# Patient Record
Sex: MALE | Race: WHITE | Employment: FULL TIME | URBAN - METROPOLITAN AREA
[De-identification: names, ages, dates, MRNs, and addresses within clinical notes are randomized per-mention and may not be internally consistent; named-entity substitution may affect disease eponyms.]

---

## 2021-07-30 ENCOUNTER — OFFICE VISIT (OUTPATIENT)
Dept: ORTHOPEDIC SURGERY | Age: 51
End: 2021-07-30
Payer: COMMERCIAL

## 2021-07-30 VITALS — WEIGHT: 170 LBS | BODY MASS INDEX: 23.8 KG/M2 | HEIGHT: 71 IN

## 2021-07-30 VITALS — BODY MASS INDEX: 23.8 KG/M2 | WEIGHT: 170 LBS | HEIGHT: 71 IN

## 2021-07-30 DIAGNOSIS — M79.604 RIGHT LEG PAIN: Primary | ICD-10-CM

## 2021-07-30 DIAGNOSIS — M54.31 SCIATICA OF RIGHT SIDE: Primary | ICD-10-CM

## 2021-07-30 DIAGNOSIS — M25.551 RIGHT HIP PAIN: ICD-10-CM

## 2021-07-30 DIAGNOSIS — S76.311A TEAR OF RIGHT HAMSTRING: ICD-10-CM

## 2021-07-30 PROCEDURE — 1036F TOBACCO NON-USER: CPT | Performed by: ORTHOPAEDIC SURGERY

## 2021-07-30 PROCEDURE — E0114 CRUTCH UNDERARM PAIR NO WOOD: HCPCS | Performed by: ORTHOPAEDIC SURGERY

## 2021-07-30 PROCEDURE — G8420 CALC BMI NORM PARAMETERS: HCPCS | Performed by: ORTHOPAEDIC SURGERY

## 2021-07-30 PROCEDURE — 3017F COLORECTAL CA SCREEN DOC REV: CPT | Performed by: ORTHOPAEDIC SURGERY

## 2021-07-30 PROCEDURE — G8427 DOCREV CUR MEDS BY ELIG CLIN: HCPCS | Performed by: ORTHOPAEDIC SURGERY

## 2021-07-30 PROCEDURE — 99204 OFFICE O/P NEW MOD 45 MIN: CPT | Performed by: ORTHOPAEDIC SURGERY

## 2021-07-30 PROCEDURE — 99214 OFFICE O/P EST MOD 30 MIN: CPT | Performed by: ORTHOPAEDIC SURGERY

## 2021-07-30 RX ORDER — LOSARTAN POTASSIUM 100 MG/1
TABLET ORAL
COMMUNITY
Start: 2021-06-22

## 2021-07-30 RX ORDER — TADALAFIL 20 MG/1
TABLET ORAL
COMMUNITY
Start: 2021-07-07

## 2021-07-30 RX ORDER — HYDROCHLOROTHIAZIDE 25 MG/1
TABLET ORAL
COMMUNITY
Start: 2021-06-22

## 2021-07-30 NOTE — LETTER
[facility]  Surgery Scheduling Form:    21     DEMOGRAPHICS    Patient Name:  Jenn Tripp  Patient :  1970   Patient SS#:  [id number]    Patient Phone:  [phone] (home) [phone] (work) Alt. Patient Phone:    Patient Address:  [address] [address]    PCP:  No primary care provider on file. Insurance:  Payor: [employer] / Plan: [place] / Product Type: *No Product type* /   Insurance ID Number:  Payor/Plan Subscr  Sex Relation Sub.  Ins. ID Effective Group Num   1. [facility]* Ailin Late 1970 Male Self [id number]                                   [address]       DIAGNOSIS & PROCEDURE    Diagnosis: RIGHT  S76.311A Right proximal hamstring tendon rupture   Right sciatic neuropraxia and neuritis    Operation: RIGHT  09923 Proximal hamstring repair  14436 Sciatic Neurolysis    Provider:  Latonya Baptiste MD    Location:  [facility] INFORMATION    Requested Date:  2021   Requested Time:  12:30 pm      Patient Arrival Time:  10:30 am   OR Time Required:  60 Minutes  Admission:  []Outpatient   []23 hour  []Same Day Admit:    days  []Inpatient    Anesthesia:  [x]General  []Spinal  []MAC/Sedation  Regional Anesthesia:  [x]None  []Lumbar Plexus Block  []Fascia Iliaca  []Femoral  []Adductor canal  []Interscalene Block  []Insert Catheter       EQUIPMENT    Position:  []Supine  []Lateral  []Beach-chair  []Prone    OR Bed:  [x]Regular  []Victoria  []Mason  []Hip ling  []Beach-chair  []Spyder  Radiology:  []Large C-arm  []Small C-arm  []Portable X-ray    Instrument Trays:  [x]General ortho set  []Feroz special hip retractors     Implants:  [place]: Anchors and sutures      SUTURE: []#5 Ethibond  []#2 Ethibond  []#2 Quill  []#1 PDS  [x]#1 Vicryl                   [x]2-0 Vicryl  []3-0 Monocryl  []2-0 Nylon  []3-0 Nylon  []3-0 PDS                    []Dermabond  []Steri-strips (in half) [x]Staples  DRESSING:  []Prineo dermabond  [x]4x4 gauze  [x]ABDs  [x]Tegaderm  BRACE: []Pelvic Binder  []Hip X-ACT  []Knee TROM  []Knee immobilizer                 []Ice Unit  []Ace-Wrap   [x]Crutches / [employer]      [][employer]:  Fredo Melgar [phone], [email or web address]  []Medline: Rhett Been [phone], [email or web address]  []Synthes: Katie Mason [phone]  [x][place]: Maikel Walker      Comments:        Latonya Baptiste MD  [address] [place] [address] Physicians  7/30/2021       4:30 PM EDT
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
## 2021-08-02 ENCOUNTER — TELEPHONE (OUTPATIENT)
Dept: ORTHOPEDIC SURGERY | Age: 51
End: 2021-08-02

## 2021-08-02 DIAGNOSIS — S76.311A TEAR OF RIGHT HAMSTRING: Primary | ICD-10-CM

## 2021-08-02 PROCEDURE — L1832 KO ADJ JNT POS R SUP PRE CST: HCPCS | Performed by: ORTHOPAEDIC SURGERY

## 2021-08-02 RX ORDER — IBUPROFEN 800 MG/1
800 TABLET ORAL EVERY 6 HOURS PRN
COMMUNITY

## 2021-08-02 NOTE — PROGRESS NOTES
[address] patients having surgery or anesthesia are required to be Covid tested OR to have been vaccinated at least 14 days prior to your procedure. It is very important to return our call to [phone] and notify the staff of your last vaccination date otherwise you will be required to complete Covid PCR test within the 5-6 days prior to surgery & quarantine. The results will need to be faxed to PreAdmission Testing at [phone]. PRIOR TO PROCEDURE DATE:        1. PLEASE FOLLOW ANY  GUIDELINES/ INSTRUCTIONS PRIOR TO YOUR PROCEDURE AS ADVISED BY YOUR SURGEON. 2. Arrange for someone to drive you home and be with you for the first 24 hours after discharge for your safety after your procedure for which you received sedation. Ensure it is someone we can share information with regarding your discharge. 3. You must contact your surgeon for instructions IF:   You are taking any blood thinners, aspirin, anti-inflammatory or vitamin E.   There is a change in your physical condition such as a cold, fever, rash, cuts, sores or any other infection, especially near your surgical site. 4. Do not drink alcohol the day before or day of your procedure. 5. A Pre-op History and Physical for surgery MUST be completed by your Physician or Urgent Care within 30 days of your procedure date. Please bring a copy with you on the day of your procedure and along with any other testing performed. THE DAY OF YOUR PROCEDURE:  1. Follow instructions for ARRIVAL TIME as DIRECTED BY YOUR SURGEON. 2. Enter the MAIN entrance from [address] [place] [address] and follow the signs to the free [facility] or [facility] parking (offered free of charge 6am-5pm). 3. Enter the Main Entrance of the hospital (do not enter from the lower level of the parking garage). Upon entrance, check in with the  at the main desk on your left. If no one is available at the desk, proceed into the [facility] Waiting Room and go through the door directly into the [facility]. There is a Check-in desk ACROSS from Room 5 (marked with a sign hanging from the ceiling). The phone number for the surgery center is [phone]. 4. Please call [phone] option #2 option #2 if you have not been preregistered yet. On the day of your procedure bring your insurance card and photo ID. You will be registered at your bedside once brought back to your room. 5. DO NOT EAT ANYTHING eight hours prior to your arrival for surgery. May have 8 ounces of water 4 hours prior to your arrival for surgery. NOTE: ALL Gastric, Bariatric and Bowel surgery patients MUST follow their surgeon's instructions. 6. MEDICATIONS    Take the following medications with a SMALL sip of water:    Bariatric patient's call surgeon if on diabetic medications as some need to be stopped 1 week preop   Use your usual dose of inhalers the morning of surgery. BRING your rescue inhaler with you to hospital.    Anesthesia does NOT want you to take insulin the morning of surgery. They will control your blood sugar while you are at the hospital. Please contact your ordering physician for instructions regarding your insulin the night before your procedure. If you have an insulin pump, please keep it set on basal rate. 7. Do not swallow water when brushing teeth. No gum, candy, mints or ice chips. Refrain from smoking or at least decrease the amount. 8. Dress in loose, comfortable clothing appropriate for redressing after your procedure. Do not wear jewelry (including body piercings), make-up (especially NO eye make-up), fingernail polish (NO toenail polish if foot/leg surgery), lotion, powders or metal hairclips. 9. Dentures, glasses, or contacts will need to be removed before your procedure.  Bring cases for your glasses, contacts, dentures, or hearing aids to protect them while you are in surgery. 10. If you use a CPAP, please bring it with you on the day of your procedure. 11. We recommend that valuable personal  belongings such as cash, cell phones, e-tablets or jewelry, be left at home during your stay. The hospital will not be responsible for valuables that are not secured in the hospital safe. However, if your insurance requires a co-pay, you may want to bring a method of payment, i.e. Check or credit card, if you wish to pay your co-pay the day of surgery. 12. If you are to stay overnight, you may bring a bag with personal items. Please have any large items you may need brought in by your family after your arrival to your hospital room. 15. If you have a Living Will or Durable Power of , please bring a copy on the day of your procedure. 15. With your permission, one family member may accompany you while you are being prepared for surgery. Once you are ready, additional family members may join you. HOW WE KEEP YOU SAFE and WORK TO PREVENT SURGICAL SITE INFECTIONS:  1. Health care workers should always check your ID bracelet to verify your name and birth date. You will be asked many times to state your name, date of birth, and allergies. 2. Health care workers should always clean their hands with soap or alcohol gel before providing care to you. It is okay to ask anyone if they cleaned their hands before they touch you. 3. You will be actively involved in verifying the type of procedure you are having and ensuring the correct surgical site. This will be confirmed multiple times prior to your procedure. Do NOT roosevelt your surgery site UNLESS instructed to by your surgeon. 4. Do not shave or wax for 72 hours prior to procedure near your operative site. Shaving with a razor can irritate your skin and make it easier to develop an infection.  On the day of your procedure, any hair that needs to be removed near the surgical site will be clipped by a healthcare worker using a special clippers designed to avoid skin irritation. 5. When you are in the operating room, your surgical site will be cleansed with a special soap, and in most cases, you will be given an antibiotic before the surgery begins. What to expect AFTER YOUR PROCEDURE:  1. Immediately following your procedure, your will be taken to the PACU for the first phase of your recovery. Your nurse will help you recover from any potential side effects of anesthesia, such as extreme drowsiness, changes in your vital signs or breathing patterns. Nausea, headache, muscle aches, or sore throat may also occur after anesthesia. Your nurse will help you manage these potential side effects. 2. For comfort and safety, arrange to have someone at home with you for the first 24 hours after discharge. 3. You and your family will be given written instructions about your diet, activity, dressing care, medications, and return visits. 4. Once at home, should issues with nausea, pain, or bleeding occur, or should you notice any signs of infection, you should call your surgeon. 5. Always clean your hands before and after caring for your wound. Do not let your family touch your surgery site without cleaning their hands. 6. Narcotic pain medications can cause significant constipation. You may want to add a stool softener to your postoperative medication schedule or speak to your surgeon on how best to manage this SIDE EFFECT. SPECIAL INSTRUCTIONS   Thank you for allowing us to care for you. We strive to exceed your expectations in the delivery of care and service provided to you and your family. If you need to contact the [place] [address] staff for any reason, please call us at [phone]    Instructions reviewed with patient during preadmission testing phone interview.   Quincy Xiao RN.8/2/2021 .4:00 PM      ADDITIONAL EDUCATIONAL INFORMATION REVIEWED PER PHONE WITH YOU AND/OR YOUR FAMILY:    Yes Antibacterial Soap

## 2021-08-03 ENCOUNTER — TELEPHONE (OUTPATIENT)
Dept: ORTHOPEDIC SURGERY | Age: 51
End: 2021-08-03

## 2021-08-03 ENCOUNTER — ANESTHESIA EVENT (OUTPATIENT)
Dept: OPERATING ROOM | Age: 51
End: 2021-08-03
Payer: COMMERCIAL

## 2021-08-03 NOTE — PROGRESS NOTES
Name:   :   MR#:   Place patient label inside box (if no patient label, complete above)  PROCEDURAL INFORMED [address] / PROCEDURE  1. I (we), Rosi Lane (Patient Name) authorize DR. Katherin Guerra (Provider / Fadumo Conception) and/or such assistants as may be selected by him/her, to perform the following operation/procedure(s):  RIGHT PROXIMAL HAMSTRING REPAIR/ SCIATIC NEUROLYSIS    Note: If unable to obtain consent prior to an emergent procedure, document the emergent reason in the medical record. This procedure has been explained to my (our) satisfaction and included in the explanation was:   A) The intended benefit, nature, and extent of the procedure to be performed;  B) The significant risks involved and the probability of success;  C) Alternative procedures and methods of treatment;  D) The dangers and probable consequences of such alternatives (including no procedure or treatment); E) The expected consequences of the procedure on my future health;  F) Whether other qualified individuals would be performing important surgical tasks and/or whether  would be present to advise or support the procedure. I (we) understand that there are other risks of infection and other serious complications in the pre-operative/procedural and postoperative/procedural stages of my (our) care. I (we) have asked all of the questions which I (we) thought were important in deciding whether or not to undergo treatment or diagnosis. These questions have been answered to my (our) satisfaction. I (we) understand that no assurance can be given that the procedure will be a success, and no guarantee or warranty of success has been given to me (us).     2. It has been explained to me (us) that during the course of the operation/procedure, unforeseen conditions may be revealed that necessitate extension of the original procedure(s) or different procedure(s) than those set forth in Paragraph 1. I (we) authorize and request that the above-named physician, his/her assistants or his/her designees, perform procedures as necessary and desirable if deemed to be in my (our) best interest.     3. I acknowledge that health care personnel may be observing this procedure for the purpose of medical education or other specified purposes as may be necessary as requested and/or approved by my (our) physician. 4. I (we) consent to the disposal by the hospital Pathologist of the removed tissue, parts or organs in accordance with hospital policy. 5. I do ____ do not ____ consent to the use of a local infiltration pain blocking agent that will be used by my provider/surgical provider to help alleviate pain during my procedure. 6. I do ____ do not ____ consent to an emergent blood transfusion in the case of a life-threatening situation that requires blood components to be administered. This consent is valid for 24 hours from the beginning of the procedure. 7. This patient does ____ or does not ____ currently have a DNR status/order. If DNR order is in place, obtain Addendum to the Surgical Consent for ALL Patients with a DNR Order to address leela-operative status for limited intervention or DNR suspension.      8. I have read and fully understand the above Consent for Operation/Procedure and that all blanks were completed before I signed the consent.     ____________________          _____________________          ______/_____am/pm  Signature of Patient or legal representative               Printed Name / Relationship                      Date / Time      ____________________          _____________________          ______/______am/pm  Witness to Signature                             Printed Name                      Date / Time     (If patient is unable to sign or is a minor, complete the following)  Patient is a minor, ____ years of age, or unable to sign because: ________________________________________________________________    Bennett Thompson If a phone consent is obtained, consent will be documented by using two health care professionals, each affirming that the consenting party has no questions and gives consent for the procedure discussed with the physician/provider.   _____________________          ____________________       ______/______am/pm   2nd witness to phone consent           Printed name                                Date / Time      Informed Consent:  I have provided the explanation described above in section 1 to the patient and/or legal representative. I have provided the patient and/or legal representative with an opportunity to ask any questions about the proposed operation/procedure.     ____________________          ____________________          ______/______am/pm  Provider / Proceduralist                            Printed name                   Date / Time        Revised 8. 2.2021          page 2 of 2

## 2021-08-03 NOTE — TELEPHONE ENCOUNTER
7/30/2021  PA requsted via [facility] by online w/clinicals.   Reference # [id number]

## 2021-08-04 ENCOUNTER — ANESTHESIA (OUTPATIENT)
Dept: OPERATING ROOM | Age: 51
End: 2021-08-04
Payer: COMMERCIAL

## 2021-08-04 ENCOUNTER — HOSPITAL ENCOUNTER (OUTPATIENT)
Age: 51
Discharge: HOME | End: 2021-08-04
Attending: ORTHOPAEDIC SURGERY | Admitting: ORTHOPAEDIC SURGERY
Payer: COMMERCIAL

## 2021-08-04 VITALS
OXYGEN SATURATION: 89 % | RESPIRATION RATE: 9 BRPM | SYSTOLIC BLOOD PRESSURE: 95 MMHG | TEMPERATURE: 98.8 F | DIASTOLIC BLOOD PRESSURE: 65 MMHG

## 2021-08-04 VITALS
TEMPERATURE: 97.6 F | HEIGHT: 71 IN | HEART RATE: 79 BPM | BODY MASS INDEX: 24.36 KG/M2 | OXYGEN SATURATION: 95 % | RESPIRATION RATE: 14 BRPM | WEIGHT: 174 LBS | SYSTOLIC BLOOD PRESSURE: 145 MMHG | DIASTOLIC BLOOD PRESSURE: 97 MMHG

## 2021-08-04 DIAGNOSIS — S76.311A TEAR OF RIGHT HAMSTRING: Primary | ICD-10-CM

## 2021-08-04 PROCEDURE — 6360000002 HC RX W HCPCS: Performed by: ANESTHESIOLOGY

## 2021-08-04 PROCEDURE — 3600000004 HC SURGERY LEVEL 4 BASE: Performed by: ORTHOPAEDIC SURGERY

## 2021-08-04 PROCEDURE — 7100000000 HC PACU RECOVERY - FIRST 15 MIN: Performed by: ORTHOPAEDIC SURGERY

## 2021-08-04 PROCEDURE — 7100000001 HC PACU RECOVERY - ADDTL 15 MIN: Performed by: ORTHOPAEDIC SURGERY

## 2021-08-04 PROCEDURE — 2500000003 HC RX 250 WO HCPCS: Performed by: NURSE ANESTHETIST, CERTIFIED REGISTERED

## 2021-08-04 PROCEDURE — 2580000003 HC RX 258: Performed by: PHYSICIAN ASSISTANT

## 2021-08-04 PROCEDURE — 2709999900 HC NON-CHARGEABLE SUPPLY: Performed by: ORTHOPAEDIC SURGERY

## 2021-08-04 PROCEDURE — 3600000014 HC SURGERY LEVEL 4 ADDTL 15MIN: Performed by: ORTHOPAEDIC SURGERY

## 2021-08-04 PROCEDURE — 6370000000 HC RX 637 (ALT 250 FOR IP): Performed by: ANESTHESIOLOGY

## 2021-08-04 PROCEDURE — 64722 DECOMPRESSION UNSPEC NERVE: CPT | Performed by: ORTHOPAEDIC SURGERY

## 2021-08-04 PROCEDURE — 6360000002 HC RX W HCPCS: Performed by: NURSE ANESTHETIST, CERTIFIED REGISTERED

## 2021-08-04 PROCEDURE — C1713 ANCHOR/SCREW BN/BN,TIS/BN: HCPCS | Performed by: ORTHOPAEDIC SURGERY

## 2021-08-04 PROCEDURE — 2720000010 HC SURG SUPPLY STERILE: Performed by: ORTHOPAEDIC SURGERY

## 2021-08-04 PROCEDURE — 2580000003 HC RX 258: Performed by: ORTHOPAEDIC SURGERY

## 2021-08-04 PROCEDURE — 6370000000 HC RX 637 (ALT 250 FOR IP): Performed by: ORTHOPAEDIC SURGERY

## 2021-08-04 PROCEDURE — 6360000002 HC RX W HCPCS: Performed by: PHYSICIAN ASSISTANT

## 2021-08-04 PROCEDURE — 3700000001 HC ADD 15 MINUTES (ANESTHESIA): Performed by: ORTHOPAEDIC SURGERY

## 2021-08-04 PROCEDURE — 7100000010 HC PHASE II RECOVERY - FIRST 15 MIN: Performed by: ORTHOPAEDIC SURGERY

## 2021-08-04 PROCEDURE — 2580000003 HC RX 258: Performed by: ANESTHESIOLOGY

## 2021-08-04 PROCEDURE — 27385 REPAIR OF THIGH MUSCLE: CPT | Performed by: ORTHOPAEDIC SURGERY

## 2021-08-04 PROCEDURE — 3700000000 HC ANESTHESIA ATTENDED CARE: Performed by: ORTHOPAEDIC SURGERY

## 2021-08-04 PROCEDURE — 7100000011 HC PHASE II RECOVERY - ADDTL 15 MIN: Performed by: ORTHOPAEDIC SURGERY

## 2021-08-04 PROCEDURE — 6360000002 HC RX W HCPCS: Performed by: ORTHOPAEDIC SURGERY

## 2021-08-04 DEVICE — ICONIX 2 NEEDLES WITH INTELLIBRAID TECHNOLOGY, 2.3MM ANCHOR WITH 2.0MM XBRAID TT
Type: IMPLANTABLE DEVICE | Status: FUNCTIONAL
Brand: ICONIX

## 2021-08-04 RX ORDER — SODIUM CHLORIDE 0.9 % (FLUSH) 0.9 %
10 SYRINGE (ML) INJECTION PRN
Status: DISCONTINUED | OUTPATIENT
Start: 2021-08-04 | End: 2021-08-04 | Stop reason: HOSPADM

## 2021-08-04 RX ORDER — OXYCODONE HYDROCHLORIDE AND ACETAMINOPHEN 5; 325 MG/1; MG/1
1 TABLET ORAL
Status: COMPLETED | OUTPATIENT
Start: 2021-08-04 | End: 2021-08-04

## 2021-08-04 RX ORDER — DEXAMETHASONE SODIUM PHOSPHATE 4 MG/ML
INJECTION, SOLUTION INTRA-ARTICULAR; INTRALESIONAL; INTRAMUSCULAR; INTRAVENOUS; SOFT TISSUE PRN
Status: DISCONTINUED | OUTPATIENT
Start: 2021-08-04 | End: 2021-08-04 | Stop reason: SDUPTHER

## 2021-08-04 RX ORDER — FENTANYL CITRATE 50 UG/ML
25 INJECTION, SOLUTION INTRAMUSCULAR; INTRAVENOUS EVERY 5 MIN PRN
Status: DISCONTINUED | OUTPATIENT
Start: 2021-08-04 | End: 2021-08-04 | Stop reason: HOSPADM

## 2021-08-04 RX ORDER — HYDRALAZINE HYDROCHLORIDE 20 MG/ML
5 INJECTION INTRAMUSCULAR; INTRAVENOUS EVERY 10 MIN PRN
Status: DISCONTINUED | OUTPATIENT
Start: 2021-08-04 | End: 2021-08-04 | Stop reason: HOSPADM

## 2021-08-04 RX ORDER — LIDOCAINE HCL/PF 100 MG/5ML
SYRINGE (ML) INJECTION PRN
Status: DISCONTINUED | OUTPATIENT
Start: 2021-08-04 | End: 2021-08-04 | Stop reason: SDUPTHER

## 2021-08-04 RX ORDER — LABETALOL HYDROCHLORIDE 5 MG/ML
5 INJECTION, SOLUTION INTRAVENOUS EVERY 10 MIN PRN
Status: DISCONTINUED | OUTPATIENT
Start: 2021-08-04 | End: 2021-08-04 | Stop reason: HOSPADM

## 2021-08-04 RX ORDER — SODIUM CHLORIDE 9 MG/ML
INJECTION, SOLUTION INTRAVENOUS CONTINUOUS
Status: DISCONTINUED | OUTPATIENT
Start: 2021-08-04 | End: 2021-08-04 | Stop reason: HOSPADM

## 2021-08-04 RX ORDER — PROPOFOL 10 MG/ML
INJECTION, EMULSION INTRAVENOUS PRN
Status: DISCONTINUED | OUTPATIENT
Start: 2021-08-04 | End: 2021-08-04 | Stop reason: SDUPTHER

## 2021-08-04 RX ORDER — SODIUM CHLORIDE 0.9 % (FLUSH) 0.9 %
10 SYRINGE (ML) INJECTION EVERY 12 HOURS SCHEDULED
Status: DISCONTINUED | OUTPATIENT
Start: 2021-08-04 | End: 2021-08-04 | Stop reason: HOSPADM

## 2021-08-04 RX ORDER — FENTANYL CITRATE 50 UG/ML
INJECTION, SOLUTION INTRAMUSCULAR; INTRAVENOUS PRN
Status: DISCONTINUED | OUTPATIENT
Start: 2021-08-04 | End: 2021-08-04 | Stop reason: SDUPTHER

## 2021-08-04 RX ORDER — FENTANYL CITRATE 50 UG/ML
50 INJECTION, SOLUTION INTRAMUSCULAR; INTRAVENOUS EVERY 5 MIN PRN
Status: DISCONTINUED | OUTPATIENT
Start: 2021-08-04 | End: 2021-08-04 | Stop reason: HOSPADM

## 2021-08-04 RX ORDER — SODIUM CHLORIDE, SODIUM LACTATE, POTASSIUM CHLORIDE, CALCIUM CHLORIDE 600; 310; 30; 20 MG/100ML; MG/100ML; MG/100ML; MG/100ML
INJECTION, SOLUTION INTRAVENOUS CONTINUOUS
Status: DISCONTINUED | OUTPATIENT
Start: 2021-08-04 | End: 2021-08-04 | Stop reason: HOSPADM

## 2021-08-04 RX ORDER — TAMSULOSIN HYDROCHLORIDE 0.4 MG/1
0.4 CAPSULE ORAL DAILY
Qty: 30 CAPSULE | Refills: 5 | Status: SHIPPED | OUTPATIENT
Start: 2021-08-04

## 2021-08-04 RX ORDER — AMOXICILLIN 500 MG
2 CAPSULE ORAL DAILY
Status: ON HOLD | COMMUNITY
End: 2021-08-04 | Stop reason: HOSPADM

## 2021-08-04 RX ORDER — OXYCODONE HYDROCHLORIDE AND ACETAMINOPHEN 5; 325 MG/1; MG/1
1 TABLET ORAL EVERY 6 HOURS PRN
Qty: 28 TABLET | Refills: 0 | Status: SHIPPED | OUTPATIENT
Start: 2021-08-04 | End: 2021-08-11

## 2021-08-04 RX ORDER — MEPERIDINE HYDROCHLORIDE 25 MG/ML
12.5 INJECTION INTRAMUSCULAR; INTRAVENOUS; SUBCUTANEOUS EVERY 5 MIN PRN
Status: DISCONTINUED | OUTPATIENT
Start: 2021-08-04 | End: 2021-08-04 | Stop reason: HOSPADM

## 2021-08-04 RX ORDER — MIDAZOLAM HYDROCHLORIDE 1 MG/ML
INJECTION INTRAMUSCULAR; INTRAVENOUS PRN
Status: DISCONTINUED | OUTPATIENT
Start: 2021-08-04 | End: 2021-08-04 | Stop reason: SDUPTHER

## 2021-08-04 RX ORDER — ONDANSETRON 2 MG/ML
4 INJECTION INTRAMUSCULAR; INTRAVENOUS
Status: DISCONTINUED | OUTPATIENT
Start: 2021-08-04 | End: 2021-08-04 | Stop reason: HOSPADM

## 2021-08-04 RX ORDER — HYDRALAZINE HYDROCHLORIDE 20 MG/ML
5 INJECTION INTRAMUSCULAR; INTRAVENOUS EVERY 30 MIN PRN
Status: DISCONTINUED | OUTPATIENT
Start: 2021-08-04 | End: 2021-08-04 | Stop reason: HOSPADM

## 2021-08-04 RX ORDER — ONDANSETRON 2 MG/ML
INJECTION INTRAMUSCULAR; INTRAVENOUS PRN
Status: DISCONTINUED | OUTPATIENT
Start: 2021-08-04 | End: 2021-08-04 | Stop reason: SDUPTHER

## 2021-08-04 RX ORDER — ROCURONIUM BROMIDE 10 MG/ML
INJECTION, SOLUTION INTRAVENOUS PRN
Status: DISCONTINUED | OUTPATIENT
Start: 2021-08-04 | End: 2021-08-04 | Stop reason: SDUPTHER

## 2021-08-04 RX ORDER — CYCLOBENZAPRINE HCL 5 MG
5 TABLET ORAL 2 TIMES DAILY PRN
Qty: 20 TABLET | Refills: 0 | Status: SHIPPED | OUTPATIENT
Start: 2021-08-04 | End: 2021-08-14

## 2021-08-04 RX ORDER — SODIUM CHLORIDE 9 MG/ML
25 INJECTION, SOLUTION INTRAVENOUS PRN
Status: DISCONTINUED | OUTPATIENT
Start: 2021-08-04 | End: 2021-08-04 | Stop reason: HOSPADM

## 2021-08-04 RX ORDER — ASPIRIN 81 MG/1
81 TABLET ORAL 2 TIMES DAILY
Qty: 60 TABLET | Refills: 0 | Status: SHIPPED | OUTPATIENT
Start: 2021-08-05

## 2021-08-04 RX ORDER — MAGNESIUM HYDROXIDE 1200 MG/15ML
LIQUID ORAL CONTINUOUS PRN
Status: COMPLETED | OUTPATIENT
Start: 2021-08-04 | End: 2021-08-04

## 2021-08-04 RX ORDER — HYDROMORPHONE HCL 110MG/55ML
PATIENT CONTROLLED ANALGESIA SYRINGE INTRAVENOUS PRN
Status: DISCONTINUED | OUTPATIENT
Start: 2021-08-04 | End: 2021-08-04 | Stop reason: SDUPTHER

## 2021-08-04 RX ORDER — PROMETHAZINE HYDROCHLORIDE 25 MG/ML
6.25 INJECTION, SOLUTION INTRAMUSCULAR; INTRAVENOUS
Status: DISCONTINUED | OUTPATIENT
Start: 2021-08-04 | End: 2021-08-04 | Stop reason: HOSPADM

## 2021-08-04 RX ADMIN — ROCURONIUM BROMIDE 30 MG: 10 INJECTION INTRAVENOUS at 12:44

## 2021-08-04 RX ADMIN — HYDROMORPHONE HYDROCHLORIDE 0.5 MG: 2 INJECTION, SOLUTION INTRAMUSCULAR; INTRAVENOUS; SUBCUTANEOUS at 14:52

## 2021-08-04 RX ADMIN — ROCURONIUM BROMIDE 50 MG: 10 INJECTION INTRAVENOUS at 12:29

## 2021-08-04 RX ADMIN — ONDANSETRON 4 MG: 2 INJECTION INTRAMUSCULAR; INTRAVENOUS at 12:35

## 2021-08-04 RX ADMIN — FENTANYL CITRATE 100 MCG: 50 INJECTION, SOLUTION INTRAMUSCULAR; INTRAVENOUS at 12:25

## 2021-08-04 RX ADMIN — METHOCARBAMOL 1000 MG: 100 INJECTION, SOLUTION INTRAMUSCULAR; INTRAVENOUS at 13:51

## 2021-08-04 RX ADMIN — MIDAZOLAM HYDROCHLORIDE 2 MG: 2 INJECTION, SOLUTION INTRAMUSCULAR; INTRAVENOUS at 12:20

## 2021-08-04 RX ADMIN — VANCOMYCIN HYDROCHLORIDE 1000 MG: 1 INJECTION, POWDER, LYOPHILIZED, FOR SOLUTION INTRAVENOUS at 12:35

## 2021-08-04 RX ADMIN — Medication 100 MG: at 12:25

## 2021-08-04 RX ADMIN — OXYCODONE HYDROCHLORIDE AND ACETAMINOPHEN 1 TABLET: 5; 325 TABLET ORAL at 16:10

## 2021-08-04 RX ADMIN — DEXAMETHASONE SODIUM PHOSPHATE 8 MG: 4 INJECTION, SOLUTION INTRAMUSCULAR; INTRAVENOUS at 12:35

## 2021-08-04 RX ADMIN — HYDROMORPHONE HYDROCHLORIDE 0.5 MG: 2 INJECTION, SOLUTION INTRAMUSCULAR; INTRAVENOUS; SUBCUTANEOUS at 13:04

## 2021-08-04 RX ADMIN — SODIUM CHLORIDE, POTASSIUM CHLORIDE, SODIUM LACTATE AND CALCIUM CHLORIDE: 600; 310; 30; 20 INJECTION, SOLUTION INTRAVENOUS at 11:48

## 2021-08-04 RX ADMIN — HYDROMORPHONE HYDROCHLORIDE 0.5 MG: 2 INJECTION, SOLUTION INTRAMUSCULAR; INTRAVENOUS; SUBCUTANEOUS at 14:35

## 2021-08-04 RX ADMIN — FAMOTIDINE 20 MG: 10 INJECTION, SOLUTION INTRAVENOUS at 12:20

## 2021-08-04 RX ADMIN — PROPOFOL 200 MG: 10 INJECTION, EMULSION INTRAVENOUS at 12:27

## 2021-08-04 RX ADMIN — HYDRALAZINE HYDROCHLORIDE 5 MG: 20 INJECTION INTRAMUSCULAR; INTRAVENOUS at 12:04

## 2021-08-04 RX ADMIN — HYDROMORPHONE HYDROCHLORIDE 0.5 MG: 2 INJECTION, SOLUTION INTRAMUSCULAR; INTRAVENOUS; SUBCUTANEOUS at 14:27

## 2021-08-04 RX ADMIN — SUGAMMADEX 300 MG: 100 INJECTION, SOLUTION INTRAVENOUS at 13:11

## 2021-08-04 ASSESSMENT — PULMONARY FUNCTION TESTS
PIF_VALUE: 17
PIF_VALUE: 19
PIF_VALUE: 19
PIF_VALUE: 14
PIF_VALUE: 19
PIF_VALUE: 18
PIF_VALUE: 19
PIF_VALUE: 18
PIF_VALUE: 19
PIF_VALUE: 19
PIF_VALUE: 14
PIF_VALUE: 19
PIF_VALUE: 18
PIF_VALUE: 19
PIF_VALUE: 19
PIF_VALUE: 20
PIF_VALUE: 19
PIF_VALUE: 12
PIF_VALUE: 19
PIF_VALUE: 14
PIF_VALUE: 19
PIF_VALUE: 18
PIF_VALUE: 17
PIF_VALUE: 19
PIF_VALUE: 14
PIF_VALUE: 17
PIF_VALUE: 18
PIF_VALUE: 18
PIF_VALUE: 16
PIF_VALUE: 4
PIF_VALUE: 19
PIF_VALUE: 3
PIF_VALUE: 19
PIF_VALUE: 18
PIF_VALUE: 19
PIF_VALUE: 17
PIF_VALUE: 19
PIF_VALUE: 19
PIF_VALUE: 18
PIF_VALUE: 19
PIF_VALUE: 19
PIF_VALUE: 4
PIF_VALUE: 19
PIF_VALUE: 0
PIF_VALUE: 20
PIF_VALUE: 19
PIF_VALUE: 18
PIF_VALUE: 18
PIF_VALUE: 17
PIF_VALUE: 19
PIF_VALUE: 19
PIF_VALUE: 5
PIF_VALUE: 19
PIF_VALUE: 19
PIF_VALUE: 17
PIF_VALUE: 14
PIF_VALUE: 19
PIF_VALUE: 18
PIF_VALUE: 19
PIF_VALUE: 19
PIF_VALUE: 17
PIF_VALUE: 18
PIF_VALUE: 19
PIF_VALUE: 19
PIF_VALUE: 3
PIF_VALUE: 19
PIF_VALUE: 18
PIF_VALUE: 19
PIF_VALUE: 18
PIF_VALUE: 18
PIF_VALUE: 21
PIF_VALUE: 19
PIF_VALUE: 17
PIF_VALUE: 17
PIF_VALUE: 19
PIF_VALUE: 19
PIF_VALUE: 18
PIF_VALUE: 18
PIF_VALUE: 19
PIF_VALUE: 14
PIF_VALUE: 19
PIF_VALUE: 18
PIF_VALUE: 19
PIF_VALUE: 18
PIF_VALUE: 17
PIF_VALUE: 19
PIF_VALUE: 4
PIF_VALUE: 17
PIF_VALUE: 3
PIF_VALUE: 5
PIF_VALUE: 34
PIF_VALUE: 19
PIF_VALUE: 18
PIF_VALUE: 19
PIF_VALUE: 17
PIF_VALUE: 17
PIF_VALUE: 19
PIF_VALUE: 4
PIF_VALUE: 1
PIF_VALUE: 19
PIF_VALUE: 18
PIF_VALUE: 18
PIF_VALUE: 19
PIF_VALUE: 17
PIF_VALUE: 19
PIF_VALUE: 19
PIF_VALUE: 18
PIF_VALUE: 19
PIF_VALUE: 0
PIF_VALUE: 5
PIF_VALUE: 19
PIF_VALUE: 0
PIF_VALUE: 17
PIF_VALUE: 19
PIF_VALUE: 17
PIF_VALUE: 19

## 2021-08-04 ASSESSMENT — PAIN DESCRIPTION - ONSET: ONSET: ON-GOING

## 2021-08-04 ASSESSMENT — PAIN DESCRIPTION - ORIENTATION: ORIENTATION: RIGHT

## 2021-08-04 ASSESSMENT — PAIN DESCRIPTION - PAIN TYPE: TYPE: SURGICAL PAIN

## 2021-08-04 ASSESSMENT — PAIN SCALES - GENERAL
PAINLEVEL_OUTOF10: 3
PAINLEVEL_OUTOF10: 5

## 2021-08-04 ASSESSMENT — PAIN - FUNCTIONAL ASSESSMENT: PAIN_FUNCTIONAL_ASSESSMENT: ACTIVITIES ARE NOT PREVENTED

## 2021-08-04 ASSESSMENT — PAIN DESCRIPTION - DESCRIPTORS: DESCRIPTORS: DULL;ACHING

## 2021-08-04 ASSESSMENT — PAIN DESCRIPTION - LOCATION: LOCATION: BUTTOCKS

## 2021-08-04 ASSESSMENT — PAIN DESCRIPTION - FREQUENCY: FREQUENCY: CONTINUOUS

## 2021-08-04 ASSESSMENT — PAIN DESCRIPTION - PROGRESSION: CLINICAL_PROGRESSION: NOT CHANGED

## 2021-08-04 NOTE — PROGRESS NOTES
PACU Transfer to [facility]    Vitals:    08/04/21 1545   BP: (!) 140/97   Pulse: 80   Resp: 9   Temp:    SpO2: 100%     BP within 20% of baseline.        Intake/Output Summary (Last 24 hours) at 8/4/2021 1601  Last data filed at 8/4/2021 1550  Gross per 24 hour   Intake 1737 ml   Output 0 ml   Net 1737 ml       Pain assessment:  None       Patient transferred to care of Kathy 9.    8/4/2021 4:01 PM

## 2021-08-04 NOTE — ANESTHESIA POSTPROCEDURE EVALUATION
Department of Anesthesiology  Postprocedure Note    Patient: Kike Tariq  MRN: [id number]  YOB: 1970  Date of evaluation: 8/4/2021  Time:  7:47 PM     Procedure Summary     Date: 08/04/21 Room / Location: [address] [place] [address] [address]    Anesthesia Start: 8904 Anesthesia Stop: 1500    Procedure: RIGHT PROXIMAL HAMSTRING REPAIR/ SCIATIC NEUROLYSIS (Right ) Diagnosis:       Rupture of right proximal hamstring tendon, initial encounter      Injury of right sciatic nerve, initial encounter      (Rupture of right proximal hamstring tendon, initial encounter [S76.311A] Injury of right sciatic nerve, initial encounter [S74.01XA])    Surgeons: Tito Loaiza MD Responsible Provider: Karson Childress DO    Anesthesia Type: general ASA Status: 2          Anesthesia Type: general    Akash Phase I: Akash Score: 10    Akash Phase II: Akash Score: 10    Last vitals: Reviewed and per EMR flowsheets.        Anesthesia Post Evaluation    Patient location during evaluation: PACU  Patient participation: complete - patient participated  Level of consciousness: awake and alert  Pain score: 3  Airway patency: patent  Nausea & Vomiting: no nausea and no vomiting  Complications: no  Cardiovascular status: hemodynamically stable  Respiratory status: acceptable  Hydration status: stable
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
## 2021-08-04 NOTE — PROGRESS NOTES
1210 5mg Apresoline given IV per protocol as ordered for elevated BP. Will monitor. Current /97 with HR 77 with OR CRNA in room. [address] [facility] Dr. Grady Shafer to see pt and notified of elevated /111 in RUE with HR 74 while up in chair and 164/112 with HR 76 in LUE while lying in bed with orders noted. Call light in reach and S.O. at bedside. Will monitor.

## 2021-08-04 NOTE — H&P
Jenn Tripp    [id number]    [facility] Same Day Surgery Update H & P  Department of General Surgery   Surgical Service   Pre-operative History and Physical  Last H & P within the last 30 days. DIAGNOSIS:   Rupture of right proximal hamstring tendon, initial encounter [S76.311A]  Injury of right sciatic nerve, initial encounter [S74.01XA]  Tear of right hamstring [S76.311A]    Procedure(s):  RIGHT PROXIMAL HAMSTRING REPAIR/ SCIATIC NEUROLYSIS     HISTORY OF PRESENT ILLNESS:   Patient with rupture of the right proximal hamstring tendon presents today for the above procedure. Covid 19:  Patient denies fever, chills, cough or known exposure to Covid-19. 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     Hypertension      Past Surgical History:        Procedure Laterality Date    COLONOSCOPY      KNEE SURGERY Right     TOE SURGERY Left      Past Social History:  Social History     Socioeconomic History    Marital status:      Spouse name: None    Number of children: None    Years of education: None    Highest education level: None   Occupational History    None   Tobacco Use    Smoking status: Never Smoker    Smokeless tobacco: Never Used   Substance and Sexual Activity    Alcohol use: Yes    Drug use: Never    Sexual activity: None   Other Topics Concern    None   Social History Narrative    None     Social Determinants of Health     Financial Resource Strain:     Difficulty of Paying Living Expenses:    Food Insecurity:     Worried About Running Out of Food in the Last Year:     [address] in the Last Year:    Transportation Needs:     Lack of Transportation (Medical):      Lack of Transportation (Non-Medical):    Physical Activity:     Days of Exercise per Week:     Minutes of Exercise per Session:    Stress:     Feeling of Stress :    Social Connections:     Frequency of Communication with Friends and Family:     Frequency of Social Gatherings with Friends and Family:     Attends [employer] Services:     Active Member of Clubs or Organizations:     Attends Club or Organization Meetings:     Marital Status:    Intimate Partner Violence:     Fear of Current or Ex-Partner:     Emotionally Abused:     Physically Abused:     Sexually Abused:          Medications Prior to Admission:      Prior to Admission medications    Medication Sig Start Date End Date Taking? Authorizing Provider   hydroCHLOROthiazide (HYDRODIURIL) 25 MG tablet  6/22/21  Yes Historical Provider, MD   losartan (COZAAR) 100 MG tablet  6/22/21  Yes Historical Provider, MD   ibuprofen (ADVIL;MOTRIN) 800 MG tablet Take 800 mg by mouth every 6 hours as needed for Pain    Historical Provider, MD   tadalafil (CIALIS) 20 MG tablet  7/7/21   Historical Provider, MD         Allergies:  Patient has no known allergies. PHYSICAL EXAM:      Pulse 74   Temp 98.2 °F (36.8 °C) (Oral)   Resp 16   Ht 5' 11\" (1.803 m)   Wt 174 lb (78.9 kg)   SpO2 96%   BMI 24.27 kg/m²      Airway:  Airway patent with no audible stridor    Heart:  Regular rate and rhythm, No murmur noted    Lungs:  No increased work of breathing, good air exchange, clear to auscultation bilaterally, no crackles or wheezing    Abdomen:  Soft, non-distended, non-tender, no rebound tenderness or guarding, and no masses palpated    ASSESSMENT AND PLAN     Patient is a 46 y.o. male with above specified procedure planned. 1.  The patients history and physical was obtained and signed off by the pre-admission testing department. Patient seen and focused exam done today- no new changes since last physical exam on 8/2/21    2. Access to ancillary services are available per request of the provider.     KOKO Martinez - CNP     8/4/2021

## 2021-08-04 NOTE — ANESTHESIA PRE PROCEDURE
Department of Anesthesiology  Preprocedure Note       Name:  Milly Brasher   Age:  46 y.o.  :  1970                                          MRN:  [id number]         Date:  2021      Surgeon: Xiang Stewart): Pricilla Serrano MD    Procedure: Procedure(s):  RIGHT PROXIMAL HAMSTRING REPAIR/ SCIATIC NEUROLYSIS    Medications prior to admission:   Prior to Admission medications    Medication Sig Start Date End Date Taking?  Authorizing Provider   Omega-3 Fatty Acids (FISH OIL) 1200 MG CAPS Take 2 capsules by mouth daily   Yes Historical Provider, MD   hydroCHLOROthiazide (HYDRODIURIL) 25 MG tablet  21  Yes Historical Provider, MD   losartan (COZAAR) 100 MG tablet  21  Yes Historical Provider, MD   tadalafil (CIALIS) 20 MG tablet  21  Yes Historical Provider, MD   ibuprofen (ADVIL;MOTRIN) 800 MG tablet Take 800 mg by mouth every 6 hours as needed for Pain    Historical Provider, MD       Current medications:    Current Facility-Administered Medications   Medication Dose Route Frequency Provider Last Rate Last Admin    vancomycin (VANCOCIN) 1,000 mg in dextrose 5 % 250 mL IVPB  1,000 mg Intravenous On Call to [facility]        sodium chloride flush 0.9 % injection 10 mL  10 mL Intravenous 2 times per day Shira Leyden, DO        sodium chloride flush 0.9 % injection 10 mL  10 mL Intravenous PRN Shira Leyden, DO        0.9 % sodium chloride infusion  25 mL Intravenous PRN Shira Leyden, DO        0.9 % sodium chloride infusion   Intravenous Continuous Shira Leyden, DO        lactated ringers infusion   Intravenous Continuous Shira Leyden,  mL/hr at 21 1148 New Bag at 21 1148       Allergies:  No Known Allergies    Problem List:    Patient Active Problem List   Diagnosis Code    Tear of right hamstring Y23.909O       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     Hypertension        Past Surgical History:        Procedure Laterality Date    COLONOSCOPY      KNEE SURGERY Right     TOE SURGERY Left        Social History:    Social History     Tobacco Use    Smoking status: Never Smoker    Smokeless tobacco: Never Used   Substance Use Topics    Alcohol use: Yes                                Counseling given: Not Answered      Vital Signs (Current):   Vitals:    08/02/21 1554 08/04/21 1100 08/04/21 1133   BP:  (!) 154/111 (!) 164/112   Pulse:  74 76   Resp:  16    Temp:  98.2 °F (36.8 °C)    TempSrc:  Oral    SpO2:  96%    Weight: 174 lb (78.9 kg) 174 lb (78.9 kg)    Height: 5' 11\" (1.803 m) 5' 11\" (1.803 m)                                               BP Readings from Last 3 Encounters:   08/04/21 (!) 164/112       NPO Status: Time of last liquid consumption: 0820                        Time of last solid consumption: 2030                        Date of last liquid consumption: 08/04/21                        Date of last solid food consumption: 08/03/21    BMI:   Wt Readings from Last 3 Encounters:   08/04/21 174 lb (78.9 kg)   07/30/21 170 lb (77.1 kg)   07/30/21 170 lb (77.1 kg)     Body mass index is 24.27 kg/m². CBC: No results found for: WBC, RBC, HGB, HCT, MCV, RDW, PLT    CMP: No results found for: NA, K, CL, CO2, BUN, CREATININE, GFRAA, AGRATIO, LABGLOM, GLUCOSE, PROT, CALCIUM, BILITOT, ALKPHOS, AST, ALT    POC Tests: No results for input(s): POCGLU, POCNA, POCK, POCCL, POCBUN, POCHEMO, POCHCT in the last 72 hours.     Coags: No results found for: PROTIME, INR, APTT    HCG (If Applicable): No results found for: PREGTESTUR, PREGSERUM, HCG, HCGQUANT     ABGs: No results found for: PHART, PO2ART, XWE2HHG, SRS5ODZ, BEART, P1YGCBAI     Type & Screen (If Applicable):  No results found for: LABABO, LABRH    Drug/Infectious Status (If Applicable):  No results found for: HIV, HEPCAB    COVID-19 Screening (If Applicable): No results found for: COVID19        Anesthesia Evaluation  Patient summary reviewed and Nursing notes reviewed no history of anesthetic complications:   Airway: Mallampati: I  TM distance: >3 FB   Neck ROM: full  Mouth opening: > = 3 FB Dental: normal exam         Pulmonary:                              Cardiovascular:    (+) hypertension:,         Rhythm: regular  Rate: normal                 ROS comment: Has been off antihypertensives for 2 days       Neuro/Psych:   Negative Neuro/Psych ROS              GI/Hepatic/Renal: Neg GI/Hepatic/Renal ROS            Endo/Other: Negative Endo/Other ROS                    Abdominal:             Vascular: Other Findings:             Anesthesia Plan      general     ASA 2     (Hydralazine administered in SDS)  Induction: intravenous. MIPS: Prophylactic antiemetics administered. Anesthetic plan and risks discussed with patient. Plan discussed with CRNA.                   Ping Downs,    8/4/2021

## 2021-08-04 NOTE — INTERVAL H&P NOTE
Update History & Physical    The patient's History and Physical of August 3, 2021 was reviewed with the patient and I examined the patient. There was no change. The surgical site was confirmed by the patient and me. Plan: The risks, benefits, expected outcome, and alternative to the recommended procedure have been discussed with the patient. Patient understands and wants to proceed with the procedure.      Electronically signed by Gabriella Galloway MD on 8/4/2021 at 12:24 PM

## 2021-08-09 NOTE — TELEPHONE ENCOUNTER
Auth: # [id number]    Date: 8/04/2021  Type of SX:  Outpatient  Location: [facility]  CPT: 44062  DX Code: N62.719C  [facility] area: Rt hamstring  Insurance: [facility]      CPT: [id number] DENIED  Reason: Not medically necessary  Peer to Peer: [phone]

## 2021-08-20 ENCOUNTER — OFFICE VISIT (OUTPATIENT)
Dept: ORTHOPEDIC SURGERY | Age: 51
End: 2021-08-20

## 2021-08-20 VITALS — HEIGHT: 71 IN | BODY MASS INDEX: 24.36 KG/M2 | WEIGHT: 174 LBS

## 2021-08-20 DIAGNOSIS — S76.301D RIGHT HAMSTRING INJURY, SUBSEQUENT ENCOUNTER: Primary | ICD-10-CM

## 2021-08-20 PROCEDURE — 99024 POSTOP FOLLOW-UP VISIT: CPT | Performed by: ORTHOPAEDIC SURGERY

## 2021-08-20 NOTE — PROGRESS NOTES
Dr Geena Brewster      Date /Time 8/20/2021       10:57 AM EDT  Name Lyly Rodarte             1970   Location  [address] [place] [address]  MRN [id number]                Chief Complaint   Patient presents with    Post-Op Check     RIGHT HAMSTRING SX 8/4/21        History of Present Illness      Lyly Rodarte is a 46 y.o. male is here for post-op visit after RIGHT  [address]61 Proximal Hamstring repair    Patient is doing well. Patient is here for his first postoperative visit. Pain is controlled. He denies any fever, chills, or drainage. Physical Exam    Based off 1997 Exam Criteria    Ht 5' 11\" (1.803 m)   Wt 174 lb (78.9 kg)   BMI 24.27 kg/m²      Constitutional:       General: He is not in acute distress. Appearance: Normal appearance. RIGHT Hip: incision clean, intact, healing appropriately. No surrounding  erythema or fluctuance. Neuro intact distal. No evidence of DVT. Imaging           Assessment and Plan    Sonia Barr was seen today for post-op check. Diagnoses and all orders for this visit:    Right hamstring injury, subsequent encounter        Patient is approximately 2.5 weeks status post right proximal hamstring repair. Patient will initiate postoperative hamstring protocol. A copy of it was given to him today in the office. We will see him back in approximately 4 weeks or sooner if problems arise. Patient was limited to 30 degrees of extension postoperatively and has progressed to 15 degrees. Electronically signed by Geena Brewster MD on 8/20/2021 at 10:57 AM  This dictation was generated by voice recognition computer software. Although all attempts are made to edit the dictation for accuracy, there may be errors in the transcription that are not intended.

## 2021-08-24 ENCOUNTER — HOSPITAL ENCOUNTER (OUTPATIENT)
Dept: PHYSICAL THERAPY | Age: 51
Setting detail: THERAPIES SERIES
Discharge: HOME | End: 2021-08-24
Payer: COMMERCIAL

## 2021-08-24 PROCEDURE — 97110 THERAPEUTIC EXERCISES: CPT | Performed by: PHYSICAL THERAPIST

## 2021-08-24 PROCEDURE — 97140 MANUAL THERAPY 1/> REGIONS: CPT | Performed by: PHYSICAL THERAPIST

## 2021-08-24 PROCEDURE — 97161 PT EVAL LOW COMPLEX 20 MIN: CPT | Performed by: PHYSICAL THERAPIST

## 2021-08-24 NOTE — FLOWSHEET NOTE
The [facility] and [facility], [address] [facility], [facility] [place], [place]      Physical Therapy Daily Treatment Note  Date:  2021    Patient Name:  Haider Ivan    :  1970  MRN: [id number]  Restrictions/Precautions:    Physician Information:  Referring Practitioner: Yeni Mendoza  Medical/Treatment Diagnosis Information:  · Diagnosis: S79.12D (ICD-10-CM) - Right hamstring injury, subsequent encounter s/p R HS proximal repair 2021  Treatment Diagnosis:  Right hip pain M25.551   [] Conservative / [] Surgical - DOS:    Insurance/Certification information:     Plan of care signed: [] YES  [] NO  Number of Comorbidities:  []0     [x]1-2    []3+  Date of Patient follow up with Physician:       Is this a Progress Report:     []  Yes  [x]  No        If Yes:  Date Range for reporting period:  Beginning 2021  Ending    Progress report will be due (10 Rx or 30 days whichever is less):        Recertification will be due (POC Duration  / 90 days whichever is less): 2021        Visit # Insurance Allowable   1 30   Telehealth        Latex Allergy:  [x]NO      []YES  Preferred Language for Healthcare:   [x]English       []other:    SUBJECTIVE:  See eval    OBJECTIVE:    Initial Initial Current   VAS 3     LEFS 99%     ROM LEFT RIGHT    HIP Flex  65    HIP Abd      HIP add      HIP Ext      HIP IR      HIP ER      Knee ext  -15    Knee Flex  90    Ankle PF      Ankle DF      Ankle In      Ankle Ev      Strength  LEFT RIGHT    HIP Flexors  4    HIP Abductors      HIP add      HIP Ext      Hip ER      Hip IR      Knee EXT (quad)  Fair activation in 15 degrees flexion       Observation:    Test measurements:      RESTRICTIONS/PRECAUTIONS: see protocol media tab  Wk 5: 2021  Wk 6: 9/15/2021  Wk 10: 10/13/2021 **ankle weight PRE: 1# every week bridging SLR  Wk 12: 10/27/2021     Wk 0-4 2 weeks 50% WB  Knee ext block 15  Hip flexion <90    Wk 5-6 WBAT Hip flexion 90 No active HS, no active hip ext   Wk 6-12 No brace  Wean off crutches Active hip and knee flexion Bike  Hip ext, HSC anti-gravity   Mo 3-6  Full ROM  Gentle HS (S)    Mo 6-9        Exercises/Interventions:     Therapeutic Ex Sets/reps Notes   QS in knee flexion :10x10    HL TA set in knee flexion :10x10    Seated R WS and L SB x6    AP HEP    Seated gastroc stretch NV    Pt ed : HS tear, surgical precautions, seated posture, PT progression 15 min         Seated HR NV                                            Manual Intervention     Assess lumbar spine NV    Prone PROM NV    Glut/HS STW NV    PROM hip, knee, ankle 9 min Hip flexion < 90  Knee ext -15   Crutch sizing X         NMR re-education     WS 50% WB NV                                                Therapeutic Exercise and NMR EXR  [x] (45174) Provided verbal/tactile cueing for activities related to strengthening, flexibility, endurance, ROM for improvements in LE, proximal hip, and core control with self care, mobility, lifting, ambulation.  [] (88842) Provided verbal/tactile cueing for activities related to improving balance, coordination, kinesthetic sense, posture, motor skill, proprioception  to assist with LE, proximal hip, and core control in self care, mobility, lifting, ambulation and eccentric single leg control.      NMR and Therapeutic Activities:    [x] (49078 or 52815) Provided verbal/tactile cueing for activities related to improving balance, coordination, kinesthetic sense, posture, motor skill, proprioception and motor activation to allow for proper function of core, proximal hip and LE with self care and ADLs  [] (90729) Gait Re-education- Provided training and instruction to the patient for proper LE, core and proximal hip recruitment and positioning and eccentric body weight control with ambulation re-education including up and down stairs     Home Exercise Program:    [x] (32511) Reviewed/Progressed HEP activities related to strengthening, flexibility, endurance, ROM of core, proximal hip and LE for functional self-care, mobility, lifting and ambulation/stair navigation   [] (54342)Reviewed/Progressed HEP activities related to improving balance, coordination, kinesthetic sense, posture, motor skill, proprioception of core, proximal hip and LE for self care, mobility, lifting, and ambulation/stair navigation      Manual Treatments:  PROM / STM / Oscillations-Mobs:  G-I, II, III, IV (PA's, Inf., Post.)  [x] (09809) Provided manual therapy to mobilize LE, proximal hip and/or LS spine soft tissue/joints for the purpose of modulating pain, promoting relaxation,  increasing ROM, reducing/eliminating soft tissue swelling/inflammation/restriction, improving soft tissue extensibility and allowing for proper ROM for normal function with self care, mobility, lifting and ambulation. Modalities:        [] GR/ESU 15 min    [] GR 15 min  [] ESU     [] CP    [] MHP    [x] declined     Charges:  Timed Code Treatment Minutes: 24   Total Treatment Minutes: 40     BWC time in/time out:   (and requires time in and out for each CPT code)    [x] EVAL (LOW) 68212 (typically 20 minutes face-to-face)  [] EVAL (MOD) 47981 (typically 30 minutes face-to-face)  [] EVAL (HIGH) 96637 (typically 45 minutes face-to-face)  [] RE-EVAL     [x] XE(32785) x 1     [] IONTO  [] NMR (18641) x     [] VASO  [x] Manual (61509) x 1    [] Other:  [] TA x      [] Mech Traction (47588)  [] ES(attended) (09007)      [] ES (un) (29232):     GOALS:     Long Term Goals: To be achieved in: 12 weeks  1. Disability index score of 15% or less for the LEFS  to assist with reaching prior level of function. [] Progressing: [] Met: [x] Not Met: [] Adjusted  2. Patient will demonstrate increased AROM to WNL to allow for proper joint functioning as indicated by patients Functional Deficits. [] Progressing: [] Met: [x] Not Met: [] Adjusted  3.  Patient will demonstrate an increase in Strength to good proximal hip and core activation to allow for proper functional mobility as indicated by patients Functional Deficits. [] Progressing: [] Met: [x] Not Met: [] Adjusted  4. Patient will return to stairs and squatting functional activities without increased symptoms or restriction. [] Progressing: [] Met: [x] Not Met: [] Adjusted  5. Pt will transition to workout routine; GAP return to running. [] Progressing: [] Met: [x] Not Met: [] Adjusted     Progression Towards Functional goals:  [] Patient is progressing as expected towards functional goals listed. [] Progression is slowed due to complexities listed. [] Progression has been slowed due to co-morbidities. [x] Plan just implemented, too soon to assess goals progression  [] Other:     Overall Progression Towards Functional goals/ Treatment Progress Update:  [] Patient is progressing as expected towards functional goals listed. [] Progression is slowed due to complexities/Impairments listed. [] Progression has been slowed due to co-morbidities.   [x] Plan just implemented, too soon to assess goals progression <30days   [] Goals require adjustment due to lack of progress  [] Patient is not progressing as expected and requires additional follow up with physician  [] Other    Prognosis for POC: [x] Good [] Fair  [] Poor      Patient requires continued skilled intervention: [x] Yes  [] No    Treatment/Activity Tolerance:  [x] Patient able to complete treatment  [] Patient limited by fatigue  [] Patient limited by pain    [] Patient limited by other medical complications  [] Other:         Return to Play: (if applicable)   []  Stage 1: Intro to Strength   []  Stage 2: Return to Run and Strength   []  Stage 3: Return to Jump and Strength   []  Stage 4: Dynamic Strength and Agility   []  Stage 5: Sport Specific Training     []  Ready to Return to Play, Meets All Above Stages   []  Not Ready for Return to Sports   Comments:

## 2021-08-24 NOTE — PLAN OF CARE
LEFS 99%     ROM LEFT RIGHT    HIP Flex  72    HIP Abd      HIP add      HIP Ext      HIP IR      HIP ER      Knee ext  -15    Knee Flex  90    Ankle PF      Ankle DF      Ankle In      Ankle Ev      Strength  LEFT RIGHT    HIP Flexors  4    HIP Abductors      HIP add      HIP Ext      Hip ER      Hip IR      Knee EXT (quad)  Fair activation in 15 degrees flexion    Knee Flex (HS)      Ankle DF      Ankle PF      Ankle Inv      Ankle EV            Circumference  MP  SP      LE Dermatomes      LE myotomes          Flexibility L R Comment   Hamstring  dnd    Gastroc   tight     ITB       Quad      Hip flexor       Glut KTC       KTOS       Piriformis ig 4       Kev             Reflexes/Sensation:    []Dermatomes/Myotomes intact    []Reflexes equal and normal bilaterally   []Other:    Joint mobility: dnd   []Normal    []Hypo   []Hyper    Palpation: stiffness in gastroc, ITB, quad    Functional Mobility/Transfers: locked in t-scope at 15 degrees flexion, 2 crutches, NWB    Posture: flexed over crutches; seated: L lean, offloading R hip    Bandages/Dressings/Incisions: dnd this visit    Gait: (include devices/WB status) locked in t-scope at 15 degrees flexion, 2 crutches, NWB    Orthopedic Special Tests: dnd  HIP KNEE ANKLE   Test Result Test Result Test Result   Scour  Lachman's  Anterior Drawer    Log Roll  MCMurrays's  Talar Tilt    NICKI  Valgus   Squeeze Test    FADIR  Varus      Lee's  Posterior Drawer        Patellar Apprehension                               [x] Patient history, allergies, meds reviewed. Medical chart reviewed. See intake form. Review Of Systems (ROS):  [x]Performed Review of systems (Integumentary, CardioPulmonary, Neurological) by intake and observation. Intake form has been scanned into medical record. Patient has been instructed to contact their primary care physician regarding ROS issues if not already being addressed at this time.       Co-morbidities/Complexities (which will affect course of rehabilitation): HTN  []None           Arthritic conditions   []Rheumatoid arthritis (M05.9)  []Osteoarthritis (M19.91)   Cardiovascular conditions   []Hypertension (I10)  []Hyperlipidemia (E78.5)  []Angina pectoris (I20)  []Atherosclerosis (I70)   Musculoskeletal conditions   []Disc pathology   []Congenital spine pathologies   []Prior surgical intervention  []Osteoporosis (M81.8)  []Osteopenia (M85.8)   Endocrine conditions   []Hypothyroid (E03.9)  []Hyperthyroid Gastrointestinal conditions   []Constipation (J49.23)   Metabolic conditions   []Morbid obesity (E66.01)  []Diabetes type 1(E10.65) or 2 (E11.65)   []Neuropathy (G60.9)     Pulmonary conditions   []Asthma (J45)  []Coughing   []COPD (J44.9)   Psychological Disorders  []Anxiety (F41.9)  []Depression (F32.9)   []Other:   []Other:          Barriers to/and or personal factors that will affect rehab potential:              []Age  []Sex              []Motivation/Lack of Motivation                        []Co-Morbidities              []Cognitive Function, education/learning barriers              []Environmental, home barriers              []profession/work barriers  []past PT/medical experience  []other:  Justification:  Pt will do well      Falls Risk Assessment (30 days):   [] Falls Risk assessed and no intervention required. [] Falls Risk assessed and Patient requires intervention due to being higher risk   TUG score (>12s at risk):     [] Falls education provided. G-Codes:        ASSESSMENT: Pt is a 46year old male s/p R proximal HS repair. He presents with the above deficits and precautions and will benefit from PT to address these issues and transition back to function as protocol allows.    Functional Impairments:     [x]Noted lumbar/proximal hip/LE joint hypomobility   [x]Decreased LE functional ROM   [x]Decreased core/proximal hip strength and neuromuscular control   [x]Decreased LE functional strength   [x]Reduced balance/proprioceptive control   []other:      Functional Activity Limitations (from functional questionnaire and intake)   [x]Reduced ability to tolerate prolonged functional positions   [x]Reduced ability or difficulty with changes of positions or transfers between positions   [x]Reduced ability to maintain good posture and demonstrate good body mechanics with sitting, bending, and lifting   [x]Reduced ability to sleep   [x] Reduced ability or tolerance with driving and/or computer work   [x]Reduced ability to perform lifting, carrying tasks   [x]Reduced ability to squat   [x]Reduced ability to forward bend   [x]Reduced ability to ambulate prolonged functional periods/distances/surfaces   [x]Reduced ability to ascend/descend stairs   [x]Reduced ability to run, hop, cut or jump   []other:    Participation Restrictions   [x]Reduced participation in self care activities   [x]Reduced participation in home management activities   [x]Reduced participation in work activities   [x]Reduced participation in social activities. [x]Reduced participation in sport/recreation activities. Classification :    [x]Signs/symptoms consistent with post-surgical status including decreased ROM, strength and function.    []Signs/symptoms consistent with joint sprain/strain   []Signs/symptoms consistent with patella-femoral syndrome   []Signs/symptoms consistent with knee OA/hip OA   []Signs/symptoms consistent with internal derangement of knee/Hip   []Signs/symptoms consistent with functional hip weakness/NMR control      []Signs/symptoms consistent with tendinitis/tendinosis    []signs/symptoms consistent with pathology which may benefit from Dry needling      []other:      Prognosis/Rehab Potential:      []Excellent   [x]Good    []Fair   []Poor    Tolerance of evaluation/treatment:    []Excellent   [x]Good    []Fair   []Poor    Physical Therapy Evaluation Complexity Justification  [x] A history of present problem with:  [] no personal factors and/or comorbidities that impact the plan of care;  [x]1-2 personal factors and/or comorbidities that impact the plan of care  []3 personal factors and/or comorbidities that impact the plan of care  [x] An examination of body systems using standardized tests and measures addressing any of the following: body structures and functions (impairments), activity limitations, and/or participation restrictions;:  [x] a total of 1-2 or more elements   [] a total of 3 or more elements   [] a total of 4 or more elements   [x] A clinical presentation with:  [x] stable and/or uncomplicated characteristics   [] evolving clinical presentation with changing characteristics  [] unstable and unpredictable characteristics;   [x] Clinical decision making of [x] low, [] moderate, [] high complexity using standardized patient assessment instrument and/or measurable assessment of functional outcome. [x] EVAL (LOW) 95409 (typically 20 minutes face-to-face)  [] EVAL (MOD) 94599 (typically 30 minutes face-to-face)  [] EVAL (HIGH) 46832 (typically 45 minutes face-to-face)  [] RE-EVAL     PLAN  Frequency/Duration:  1 days per week for 2 Weeks; 2 days per week for 12 weeks:  Interventions:  [x]  Therapeutic exercise including: strength training, ROM, for Lower extremity and core   [x]  NMR activation and proprioception for LE, Glutes and Core   [x]  Manual therapy as indicated for LE, Hip and spine to include: Dry Needling/IASTM, STM, PROM, Gr I-IV mobilizations, manipulation. [x] Modalities as needed that may include: thermal agents, E-stim, Biofeedback, US, iontophoresis as indicated  [x] Patient education on joint protection, postural re-education, activity modification, progression of HEP. HEP instruction: Reviewed HEP and pt given handout. GOALS:  Patient stated goal: return to running, jumping, cycling  [] Progressing: [] Met: [] Not Met: [] Adjusted    Therapist goals for Patient:   Short Term Goals:  To be

## 2021-08-26 ENCOUNTER — APPOINTMENT (OUTPATIENT)
Dept: PHYSICAL THERAPY | Age: 51
End: 2021-08-26
Payer: COMMERCIAL

## 2021-08-31 ENCOUNTER — HOSPITAL ENCOUNTER (OUTPATIENT)
Dept: PHYSICAL THERAPY | Age: 51
Setting detail: THERAPIES SERIES
Discharge: HOME | End: 2021-08-31
Payer: COMMERCIAL

## 2021-08-31 NOTE — FLOWSHEET NOTE
The [address] [facility], [address] [facility], [address] [place], [place]      Physical Therapy Daily Treatment Note  Date:  2021    Patient Name:  Anita Guerrier    :  1970  MRN: [id number]  Restrictions/Precautions:    Physician Information:  Referring Practitioner: Lonnie Raymundo  Medical/Treatment Diagnosis Information:  · Diagnosis: S79.12D (ICD-10-CM) - Right hamstring injury, subsequent encounter s/p R HS proximal repair 2021  Treatment Diagnosis:  Right hip pain M25.551   [] Conservative / [] Surgical - DOS:    Insurance/Certification information:     Plan of care signed: [] YES  [] NO  Number of Comorbidities:  []0     [x]1-2    []3+  Date of Patient follow up with Physician:       Is this a Progress Report:     []  Yes  [x]  No        If Yes:  Date Range for reporting period:  Beginning 2021  Ending    Progress report will be due (10 Rx or 30 days whichever is less):        Recertification will be due (POC Duration  / 90 days whichever is less): 2021        Visit # Insurance Allowable   2 30   Telehealth        Latex Allergy:  [x]NO      []YES  Preferred Language for Healthcare:   [x]English       []other:    SUBJECTIVE:  Decreased sitting soreness. Decreased soreness with quick, reactionary UE movements.     OBJECTIVE:    Initial Initial Current   VAS 3     LEFS 99%     ROM LEFT RIGHT    HIP Flex  65    HIP Abd      HIP add      HIP Ext      HIP IR      HIP ER      Knee ext  -15    Knee Flex  90    Ankle PF      Ankle DF      Ankle In      Ankle Ev      Strength  LEFT RIGHT    HIP Flexors  4    HIP Abductors      HIP add      HIP Ext      Hip ER      Hip IR      Knee EXT (quad)  Fair activation in 15 degrees flexion       Observation:    Test measurements:      RESTRICTIONS/PRECAUTIONS: see protocol media tab  Wk 5: 2021  Wk 6: 9/15/2021  Wk 10: 10/13/2021 **ankle weight PRE: 1# every week bridging SLR  Wk 12: 10/27/2021 ADLs  [] (28973) Gait Re-education- Provided training and instruction to the patient for proper LE, core and proximal hip recruitment and positioning and eccentric body weight control with ambulation re-education including up and down stairs     Home Exercise Program:    [x] (99707) Reviewed/Progressed HEP activities related to strengthening, flexibility, endurance, ROM of core, proximal hip and LE for functional self-care, mobility, lifting and ambulation/stair navigation   [] (54497)Reviewed/Progressed HEP activities related to improving balance, coordination, kinesthetic sense, posture, motor skill, proprioception of core, proximal hip and LE for self care, mobility, lifting, and ambulation/stair navigation      Manual Treatments:  PROM / STM / Oscillations-Mobs:  G-I, II, III, IV (PA's, Inf., Post.)  [x] (00869) Provided manual therapy to mobilize LE, proximal hip and/or LS spine soft tissue/joints for the purpose of modulating pain, promoting relaxation,  increasing ROM, reducing/eliminating soft tissue swelling/inflammation/restriction, improving soft tissue extensibility and allowing for proper ROM for normal function with self care, mobility, lifting and ambulation. Modalities:        [] GR/ESU 15 min    [] GR 15 min  [] ESU     [] CP    [] MHP    [x] declined     Charges:  Timed Code Treatment Minutes: 40   Total Treatment Minutes: 40     BWC time in/time out:   (and requires time in and out for each CPT code)    [x] EVAL (LOW) 95154 (typically 20 minutes face-to-face)  [] EVAL (MOD) 90472 (typically 30 minutes face-to-face)  [] EVAL (HIGH) 87934 (typically 45 minutes face-to-face)  [] RE-EVAL     [x] ID(55969) x 1     [] IONTO  [] NMR (72690) x     [] VASO  [x] Manual (01799) x 2    [] Other:  [] TA x      [] Mech Traction (37733)  [] ES(attended) (62737)      [] ES (un) (36789):     GOALS:     Long Term Goals: To be achieved in: 12 weeks  1.  Disability index score of 15% or less for the LEFS  to assist with reaching prior level of function. [] Progressing: [] Met: [x] Not Met: [] Adjusted  2. Patient will demonstrate increased AROM to WNL to allow for proper joint functioning as indicated by patients Functional Deficits. [] Progressing: [] Met: [x] Not Met: [] Adjusted  3. Patient will demonstrate an increase in Strength to good proximal hip and core activation to allow for proper functional mobility as indicated by patients Functional Deficits. [] Progressing: [] Met: [x] Not Met: [] Adjusted  4. Patient will return to stairs and squatting functional activities without increased symptoms or restriction. [] Progressing: [] Met: [x] Not Met: [] Adjusted  5. Pt will transition to workout routine; GAP return to running. [] Progressing: [] Met: [x] Not Met: [] Adjusted     Progression Towards Functional goals:  [] Patient is progressing as expected towards functional goals listed. [] Progression is slowed due to complexities listed. [] Progression has been slowed due to co-morbidities. [x] Plan just implemented, too soon to assess goals progression  [] Other:     Overall Progression Towards Functional goals/ Treatment Progress Update:  [] Patient is progressing as expected towards functional goals listed. [] Progression is slowed due to complexities/Impairments listed. [] Progression has been slowed due to co-morbidities. [x] Plan just implemented, too soon to assess goals progression <30days   [] Goals require adjustment due to lack of progress  [] Patient is not progressing as expected and requires additional follow up with physician  [] Other    Prognosis for POC: [x] Good [] Fair  [] Poor      Patient requires continued skilled intervention: [x] Yes  [] No    Treatment/Activity Tolerance:  [x] Patient able to complete treatment  [] Patient limited by fatigue  [] Patient limited by pain    [] Patient limited by other medical complications  [] Other:     Assessment: Tolerated session well. Swelling noted in ankle joint improved with manuals. Discussed beginning scar mobilization. Return to Play: (if applicable)   []  Stage 1: Intro to Strength   []  Stage 2: Return to Run and Strength   []  Stage 3: Return to Jump and Strength   []  Stage 4: Dynamic Strength and Agility   []  Stage 5: Sport Specific Training     []  Ready to Return to Play, Meets All Above Stages   []  Not Ready for Return to Sports   Comments:                               PLAN: See eval  [x] Continue per plan of care [] Alter current plan (see comments above)  [] Plan of care initiated [] Hold pending MD visit [] Discharge      Electronically signed by:  Blessing Delgado PT    Note: If patient does not return for scheduled/ recommended follow up visits, this note will serve as a discharge from care along with most recent update on progress.

## 2021-09-07 ENCOUNTER — HOSPITAL ENCOUNTER (OUTPATIENT)
Dept: PHYSICAL THERAPY | Age: 51
Setting detail: THERAPIES SERIES
Discharge: HOME | End: 2021-09-07
Payer: COMMERCIAL

## 2021-09-07 PROCEDURE — 97110 THERAPEUTIC EXERCISES: CPT | Performed by: PHYSICAL THERAPIST

## 2021-09-07 PROCEDURE — 97140 MANUAL THERAPY 1/> REGIONS: CPT | Performed by: PHYSICAL THERAPIST

## 2021-09-07 NOTE — Clinical Note
Dr Vanessa Camargo,  You are seeing Ayaka Skates this Thursday. His pain is controlled and his ROM is inline with your protocol. We will progress ROM, gait, and HS activation per protocol. If there is anything else you would like me to be aware of, please let me know.     Thanks,  Sean Crooks

## 2021-09-07 NOTE — FLOWSHEET NOTE
The [address] [facility], [address] [facility], [address] [place], [place]      Physical Therapy Daily Treatment Note  Date:  2021    Patient Name:  Jarocho Driver    :  1970  MRN: [id number]  Restrictions/Precautions:    Physician Information:  Referring Practitioner: Ishaan Bo  Medical/Treatment Diagnosis Information:  · Diagnosis: S79.12D (ICD-10-CM) - Right hamstring injury, subsequent encounter s/p R HS proximal repair 2021  Treatment Diagnosis:  Right hip pain M25.551   [] Conservative / [] Surgical - DOS:    Insurance/Certification information:     Plan of care signed: [] YES  [] NO  Number of Comorbidities:  []0     [x]1-2    []3+  Date of Patient follow up with Physician:       Is this a Progress Report:     []  Yes  [x]  No        If Yes:  Date Range for reporting period:  Beginning 2021  Ending    Progress report will be due (10 Rx or 30 days whichever is less):        Recertification will be due (POC Duration  / 90 days whichever is less): 2021        Visit # Insurance Allowable   3 30   Telehealth        Latex Allergy:  [x]NO      []YES  Preferred Language for Healthcare:   [x]English       []other:    SUBJECTIVE:  Slight soreness at incision.     OBJECTIVE:    Initial Initial Current   VAS 3     LEFS 99%     ROM LEFT RIGHT    HIP Flex  65    HIP Abd      HIP add      HIP Ext      HIP IR      HIP ER      Knee ext  -15    Knee Flex  90    Ankle PF      Ankle DF      Ankle In      Ankle Ev      Strength  LEFT RIGHT    HIP Flexors  4    HIP Abductors      HIP add      HIP Ext      Hip ER      Hip IR      Knee EXT (quad)  Fair activation in 15 degrees flexion       Observation:    Test measurements:      RESTRICTIONS/PRECAUTIONS: see protocol media tab  Wk 5: 2021  Wk 6: 9/15/2021  Wk 10: 10/13/2021 **ankle weight PRE: 1# every week bridging SLR  Wk 12: 10/27/2021     Wk 0-4 2 weeks 50% WB  Knee ext block 15  Hip flexion <90    Wk 5-6 WBAT Hip flexion 90 No active HS, no active hip ext   Wk 6-12 No brace  Wean off crutches Active hip and knee flexion Bike  Hip ext, HSC anti-gravity   Mo 3-6  Full ROM  Gentle HS (S)    Mo 6-9        Exercises/Interventions:     Therapeutic Ex Sets/reps Notes   QS in knee flexion :10x10    HL TA set in knee flexion :10x10    Seated R WS and L SB     AP HEP    Seated gastroc stretch NV    Pt ed : HS tear, surgical precautions, seated posture, PT progression 15 min         Seated HR NV         DF TB, inv, ever 2x10 each BTB   Supine L HS stretch :30x4 RLE big wedge  RLE small wedge    Supine L piriformis stretch :30x4 RLE small wedge                       Manual Intervention     Assess lumbar spine     CFM incision 4 min    t/HS STW, gastroc, popliteus 8 min    PROM hip, knee, ankle 15 min Hip flexion < 90  Knee ext -15   Crutch sizing          NMR re-education     Gait 50% WB with brace locked and crutches     WS 50% WB- equal -balanced posture, foot tripod                                            Therapeutic Exercise and NMR EXR  [x] (57942) Provided verbal/tactile cueing for activities related to strengthening, flexibility, endurance, ROM for improvements in LE, proximal hip, and core control with self care, mobility, lifting, ambulation.  [] (67745) Provided verbal/tactile cueing for activities related to improving balance, coordination, kinesthetic sense, posture, motor skill, proprioception  to assist with LE, proximal hip, and core control in self care, mobility, lifting, ambulation and eccentric single leg control.      NMR and Therapeutic Activities:    [x] (46277 or 88382) Provided verbal/tactile cueing for activities related to improving balance, coordination, kinesthetic sense, posture, motor skill, proprioception and motor activation to allow for proper function of core, proximal hip and LE with self care and ADLs  [] (64008) Gait Re-education- Provided training and instruction to the patient for proper LE, core and proximal hip recruitment and positioning and eccentric body weight control with ambulation re-education including up and down stairs     Home Exercise Program:    [x] (05788) Reviewed/Progressed HEP activities related to strengthening, flexibility, endurance, ROM of core, proximal hip and LE for functional self-care, mobility, lifting and ambulation/stair navigation   [] (41069)Reviewed/Progressed HEP activities related to improving balance, coordination, kinesthetic sense, posture, motor skill, proprioception of core, proximal hip and LE for self care, mobility, lifting, and ambulation/stair navigation      Manual Treatments:  PROM / STM / Oscillations-Mobs:  G-I, II, III, IV (PA's, Inf., Post.)  [x] (96467) Provided manual therapy to mobilize LE, proximal hip and/or LS spine soft tissue/joints for the purpose of modulating pain, promoting relaxation,  increasing ROM, reducing/eliminating soft tissue swelling/inflammation/restriction, improving soft tissue extensibility and allowing for proper ROM for normal function with self care, mobility, lifting and ambulation. Modalities:        [] GR/ESU 15 min    [] GR 15 min  [] ESU     [] CP    [] MHP    [x] declined     Charges:  Timed Code Treatment Minutes: 40   Total Treatment Minutes: 40     BWC time in/time out:   (and requires time in and out for each CPT code)    [x] EVAL (LOW) 55715 (typically 20 minutes face-to-face)  [] EVAL (MOD) 37326 (typically 30 minutes face-to-face)  [] EVAL (HIGH) 70562 (typically 45 minutes face-to-face)  [] RE-EVAL     [x] CN(68495) x 1     [] IONTO  [] NMR (10258) x     [] VASO  [x] Manual (21853) x 2    [] Other:  [] TA x      [] Mech Traction (34943)  [] ES(attended) (09947)      [] ES (un) (19356):     GOALS:     Long Term Goals: To be achieved in: 12 weeks  1. Disability index score of 15% or less for the LEFS  to assist with reaching prior level of function.    [] Progressing: [] Met: [x] Not Met: [] Adjusted  2. Patient will demonstrate increased AROM to WNL to allow for proper joint functioning as indicated by patients Functional Deficits. [] Progressing: [] Met: [x] Not Met: [] Adjusted  3. Patient will demonstrate an increase in Strength to good proximal hip and core activation to allow for proper functional mobility as indicated by patients Functional Deficits. [] Progressing: [] Met: [x] Not Met: [] Adjusted  4. Patient will return to stairs and squatting functional activities without increased symptoms or restriction. [] Progressing: [] Met: [x] Not Met: [] Adjusted  5. Pt will transition to workout routine; GAP return to running. [] Progressing: [] Met: [x] Not Met: [] Adjusted     Progression Towards Functional goals:  [] Patient is progressing as expected towards functional goals listed. [] Progression is slowed due to complexities listed. [] Progression has been slowed due to co-morbidities. [x] Plan just implemented, too soon to assess goals progression  [] Other:     Overall Progression Towards Functional goals/ Treatment Progress Update:  [] Patient is progressing as expected towards functional goals listed. [] Progression is slowed due to complexities/Impairments listed. [] Progression has been slowed due to co-morbidities. [x] Plan just implemented, too soon to assess goals progression <30days   [] Goals require adjustment due to lack of progress  [] Patient is not progressing as expected and requires additional follow up with physician  [] Other    Prognosis for POC: [x] Good [] Fair  [] Poor      Patient requires continued skilled intervention: [x] Yes  [] No    Treatment/Activity Tolerance:  [x] Patient able to complete treatment  [] Patient limited by fatigue  [] Patient limited by pain    [] Patient limited by other medical complications  [] Other:     Assessment: Tolerated session well. Weakness posterior tib > peroneals > DF. Progressing well overall.   Will

## 2021-09-09 ENCOUNTER — OFFICE VISIT (OUTPATIENT)
Dept: ORTHOPEDIC SURGERY | Age: 51
End: 2021-09-09

## 2021-09-09 VITALS — BODY MASS INDEX: 24.36 KG/M2 | WEIGHT: 174 LBS | HEIGHT: 71 IN

## 2021-09-09 DIAGNOSIS — S76.301D RIGHT HAMSTRING INJURY, SUBSEQUENT ENCOUNTER: Primary | ICD-10-CM

## 2021-09-09 PROCEDURE — 99024 POSTOP FOLLOW-UP VISIT: CPT | Performed by: ORTHOPAEDIC SURGERY

## 2021-09-09 NOTE — PROGRESS NOTES
Dr Edith Florez      Date /Time 9/9/2021       10:57 AM EDT  Name Michael Miramontes             1970   Location  [facility]  MRN [id number]                Chief Complaint   Patient presents with    Post-Op Check     RIGHT PROXIMAL HAMSTRING REPAIR 8/4/21        History of Present Illness      Michael Miramontes is a 46 y.o. male is here for post-op visit after RIGHT  25522 Proximal Hamstring repair    Patient is doing quite well. He reports no pain. He has been doing physical therapy and has been in a T scope brace. Physical Exam    Based off 1997 Exam Criteria    Ht 5' 11\" (1.803 m)   Wt 174 lb (78.9 kg)   BMI 24.27 kg/m²      Constitutional:       General: He is not in acute distress. Appearance: Normal appearance. RIGHT Hip: incision clean, intact, healed appropriately. No surrounding  erythema or fluctuance. Neuro intact distal. No evidence of DVT. Was able to get to 0 extension at the hip and knee today without pain. Imaging       No images    Assessment and Plan    Farhat Wynn was seen today for post-op check. Diagnoses and all orders for this visit:    Right hamstring injury, subsequent encounter        Patient is approximately 5+ weeks status post right proximal hamstring repair. Patient will continue postoperative hamstring protocol. I advised he can now remove the brace, resuming 0 degrees of the hip and knee range of motion. Start to work on some stretching exercises the hamstring per protocol in a week or so. Return at the 3-month roosevelt postop. Electronically signed by Edith Florez MD on 9/9/2021 at 4:05 PM  This dictation was generated by voice recognition computer software. Although all attempts are made to edit the dictation for accuracy, there may be errors in the transcription that are not intended.

## 2021-09-14 ENCOUNTER — HOSPITAL ENCOUNTER (OUTPATIENT)
Dept: PHYSICAL THERAPY | Age: 51
Setting detail: THERAPIES SERIES
Discharge: HOME | End: 2021-09-14
Payer: COMMERCIAL

## 2021-09-14 PROCEDURE — 97140 MANUAL THERAPY 1/> REGIONS: CPT | Performed by: PHYSICAL THERAPIST

## 2021-09-14 PROCEDURE — 97112 NEUROMUSCULAR REEDUCATION: CPT | Performed by: PHYSICAL THERAPIST

## 2021-09-14 PROCEDURE — 97110 THERAPEUTIC EXERCISES: CPT | Performed by: PHYSICAL THERAPIST

## 2021-09-14 NOTE — FLOWSHEET NOTE
[facility], [address], [address] [place], [place]      Physical Therapy Daily Treatment Note  Date:  2021    Patient Name:  Jenn Tripp    :  1970  MRN: [id number]  Restrictions/Precautions:    Physician Information:  Referring Practitioner: Martín White  Medical/Treatment Diagnosis Information:  · Diagnosis: S79.12D (ICD-10-CM) - Right hamstring injury, subsequent encounter s/p R HS proximal repair 2021  Treatment Diagnosis:  Right hip pain M25.551   [] Conservative / [] Surgical - DOS:    Insurance/Certification information:     Plan of care signed: [] YES  [] NO  Number of Comorbidities:  []0     [x]1-2    []3+  Date of Patient follow up with Physician:       Is this a Progress Report:     []  Yes  [x]  No        If Yes:  Date Range for reporting period:  Beginning 2021  Ending    Progress report will be due (10 Rx or 30 days whichever is less):        Recertification will be due (POC Duration  / 90 days whichever is less): 2021        Visit # Insurance Allowable   4 30   Telehealth        Latex Allergy:  [x]NO      []YES  Preferred Language for Healthcare:   [x]English       []other:    SUBJECTIVE:  Saw MD- off crutches and out of brace. Ok to do easy activity. Has been walking and doing stairs without pain.   OBJECTIVE:    Initial Initial Current   VAS 3     LEFS 99%     ROM LEFT RIGHT    HIP Flex  65    HIP Abd      HIP add      HIP Ext      HIP IR      HIP ER      Knee ext  -15 +2   Knee Flex  90    Ankle PF      Ankle DF      Ankle In      Ankle Ev      Strength  LEFT RIGHT    HIP Flexors  4    HIP Abductors      HIP add      HIP Ext      Hip ER      Hip IR      Knee EXT (quad)  Fair activation in 15 degrees flexion Good-      Observation:    Test measurements:      RESTRICTIONS/PRECAUTIONS: see protocol media tab  Wk 5: 2021  Wk 6: 9/15/2021  Wk 10: 10/13/2021 **ankle weight PRE: 1# every week bridging SLR  Wk 12: 10/27/2021     Wk 0-4 2 weeks 50% WB  Knee ext block 15  Hip flexion <90    Wk 5-6 WBAT Hip flexion 90 No active HS, no active hip ext   Wk 6-12 No brace  Wean off crutches Active hip and knee flexion Bike  Hip ext, HSC anti-gravity   Mo 3-6  Full ROM  Gentle HS (S)    Mo 6-9        Exercises/Interventions:     Therapeutic Ex Sets/reps Notes   QS :10x10    SLR to 1/2 knee height 2x10    SLR ABD 2x10    Step up training x8 6 in Cues for TKE             CCTKE 2x10 black   LXX gastroc stretch :30x3    Pt ed : surgical precautions,  PT progression, progressive loading, UE workouts 15 min         Seated HR NV         DF TB, inv, ever  BTB   Supine L HS stretch  RLE big wedge  RLE small wedge    Supine L piriformis stretch  RLE small wedge        Seated tennis ball AROM knee ext mod range Ed for HEP              Manual Intervention     Assess lumbar spine     CFM incision     t/HS STW, gastroc, popliteus     PROM hip, knee, ankle 10 min Hip flexion 90   Crutch sizing          NMR re-education     WB  3 min Awareness of limiting L lateral shift                                               Therapeutic Exercise and NMR EXR  [x] (53435) Provided verbal/tactile cueing for activities related to strengthening, flexibility, endurance, ROM for improvements in LE, proximal hip, and core control with self care, mobility, lifting, ambulation.  [] (77884) Provided verbal/tactile cueing for activities related to improving balance, coordination, kinesthetic sense, posture, motor skill, proprioception  to assist with LE, proximal hip, and core control in self care, mobility, lifting, ambulation and eccentric single leg control.      NMR and Therapeutic Activities:    [x] (96509 or 92535) Provided verbal/tactile cueing for activities related to improving balance, coordination, kinesthetic sense, posture, motor skill, proprioception and motor activation to allow for proper function of core, proximal hip and LE with self care and ADLs  [] (73183) Gait Re-education- Provided training and instruction to the patient for proper LE, core and proximal hip recruitment and positioning and eccentric body weight control with ambulation re-education including up and down stairs     Home Exercise Program:    [x] (02116) Reviewed/Progressed HEP activities related to strengthening, flexibility, endurance, ROM of core, proximal hip and LE for functional self-care, mobility, lifting and ambulation/stair navigation   [] (49871)Reviewed/Progressed HEP activities related to improving balance, coordination, kinesthetic sense, posture, motor skill, proprioception of core, proximal hip and LE for self care, mobility, lifting, and ambulation/stair navigation      Manual Treatments:  PROM / STM / Oscillations-Mobs:  G-I, II, III, IV (PA's, Inf., Post.)  [x] (36953) Provided manual therapy to mobilize LE, proximal hip and/or LS spine soft tissue/joints for the purpose of modulating pain, promoting relaxation,  increasing ROM, reducing/eliminating soft tissue swelling/inflammation/restriction, improving soft tissue extensibility and allowing for proper ROM for normal function with self care, mobility, lifting and ambulation. Modalities:        [] GR/ESU 15 min    [] GR 15 min  [] ESU     [] CP    [] MHP    [x] declined     Charges:  Timed Code Treatment Minutes: 43   Total Treatment Minutes: 43     C time in/time out:   (and requires time in and out for each CPT code)    [x] EVAL (LOW) 60600 (typically 20 minutes face-to-face)  [] EVAL (MOD) 44624 (typically 30 minutes face-to-face)  [] EVAL (HIGH) 46196 (typically 45 minutes face-to-face)  [] RE-EVAL     [x] VQ(27367) x 1     [] IONTO  [x] NMR (00041) x  1   [] VASO  [x] Manual (37867) x 1    [] Other:  [] TA x      [] Mech Traction (82958)  [] ES(attended) (06674)      [] ES (un) (56000):     GOALS:     Long Term Goals: To be achieved in: 12 weeks  1.  Disability index score of 15% or less for the LEFS  to assist with reaching prior level of function. [] Progressing: [] Met: [x] Not Met: [] Adjusted  2. Patient will demonstrate increased AROM to WNL to allow for proper joint functioning as indicated by patients Functional Deficits. [] Progressing: [] Met: [x] Not Met: [] Adjusted  3. Patient will demonstrate an increase in Strength to good proximal hip and core activation to allow for proper functional mobility as indicated by patients Functional Deficits. [] Progressing: [] Met: [x] Not Met: [] Adjusted  4. Patient will return to stairs and squatting functional activities without increased symptoms or restriction. [] Progressing: [] Met: [x] Not Met: [] Adjusted  5. Pt will transition to workout routine; GAP return to running. [] Progressing: [] Met: [x] Not Met: [] Adjusted     Progression Towards Functional goals:  [] Patient is progressing as expected towards functional goals listed. [] Progression is slowed due to complexities listed. [] Progression has been slowed due to co-morbidities. [x] Plan just implemented, too soon to assess goals progression  [] Other:     Overall Progression Towards Functional goals/ Treatment Progress Update:  [] Patient is progressing as expected towards functional goals listed. [] Progression is slowed due to complexities/Impairments listed. [] Progression has been slowed due to co-morbidities.   [x] Plan just implemented, too soon to assess goals progression <30days   [] Goals require adjustment due to lack of progress  [] Patient is not progressing as expected and requires additional follow up with physician  [] Other    Prognosis for POC: [x] Good [] Fair  [] Poor      Patient requires continued skilled intervention: [x] Yes  [] No    Treatment/Activity Tolerance:  [x] Patient able to complete treatment  [] Patient limited by fatigue  [] Patient limited by pain    [] Patient limited by other medical complications  [] Other:     Assessment: Tolerated new TE well. Ed on precautions moving forward. Quad weakness apparent. Increase to 2x/wk over next few weeks. Return to Play: (if applicable)   []  Stage 1: Intro to Strength   []  Stage 2: Return to Run and Strength   []  Stage 3: Return to Jump and Strength   []  Stage 4: Dynamic Strength and Agility   []  Stage 5: Sport Specific Training     []  Ready to Return to Play, Meets All Above Stages   []  Not Ready for Return to Sports   Comments:                               PLAN: See eval  [x] Continue per plan of care [] Alter current plan (see comments above)  [] Plan of care initiated [] Hold pending MD visit [] Discharge      Electronically signed by:  Nadia Anderson PT    Note: If patient does not return for scheduled/ recommended follow up visits, this note will serve as a discharge from care along with most recent update on progress.

## 2021-09-21 ENCOUNTER — HOSPITAL ENCOUNTER (OUTPATIENT)
Dept: PHYSICAL THERAPY | Age: 51
Setting detail: THERAPIES SERIES
Discharge: HOME | End: 2021-09-21
Payer: COMMERCIAL

## 2021-09-21 PROCEDURE — 97110 THERAPEUTIC EXERCISES: CPT | Performed by: PHYSICAL THERAPIST

## 2021-09-21 PROCEDURE — 97140 MANUAL THERAPY 1/> REGIONS: CPT | Performed by: PHYSICAL THERAPIST

## 2021-09-21 PROCEDURE — 97112 NEUROMUSCULAR REEDUCATION: CPT | Performed by: PHYSICAL THERAPIST

## 2021-09-21 NOTE — FLOWSHEET NOTE
[facility], [address] [facility], [address] [place], [place]      Physical Therapy Daily Treatment Note  Date:  2021    Patient Name:  Abelardo Echevarria    :  1970  MRN: [id number]  Restrictions/Precautions:    Physician Information:  Referring Practitioner: Pacheco Sharma  Medical/Treatment Diagnosis Information:  · Diagnosis: S79.12D (ICD-10-CM) - Right hamstring injury, subsequent encounter s/p R HS proximal repair 2021  Treatment Diagnosis:  Right hip pain M25.551   [] Conservative / [] Surgical - DOS:    Insurance/Certification information:     Plan of care signed: [] YES  [] NO  Number of Comorbidities:  []0     [x]1-2    []3+  Date of Patient follow up with Physician:       Is this a Progress Report:     []  Yes  [x]  No        If Yes:  Date Range for reporting period:  Beginning 2021  Ending    Progress report will be due (10 Rx or 30 days whichever is less): 1/15/9054       Recertification will be due (POC Duration  / 90 days whichever is less): 2021        Visit # Insurance Allowable   5  PN NV 30   Telehealth        Latex Allergy:  [x]NO      []YES  Preferred Language for Healthcare:   [x]English       []other:    SUBJECTIVE:  Doing well. Only strain is when putting on shoes and socks.   OBJECTIVE:    Initial Initial Current   VAS 3     LEFS 99%     ROM LEFT RIGHT    HIP Flex  65    HIP Abd      HIP add      HIP Ext      HIP IR      HIP ER      Knee ext  -15 +2   Knee Flex  90    Ankle PF      Ankle DF      Ankle In      Ankle Ev      Strength  LEFT RIGHT    HIP Flexors  4    HIP Abductors      HIP add      HIP Ext      Hip ER      Hip IR      Knee EXT (quad)  Fair activation in 15 degrees flexion Good-      Observation:    Test measurements:      RESTRICTIONS/PRECAUTIONS: see protocol media tab  Wk 5: 2021  Wk 6: 9/15/2021  Wk 10: 10/13/2021 **ankle weight PRE: 1# every week bridging SLR  Wk 12: 10/27/2021     Wk 0-4 2 weeks 50% WB  Knee ext block 15  Hip flexion <90    Wk 5-6 WBAT Hip flexion 90 No active HS, no active hip ext   Wk 6-12 No brace  Wean off crutches Active hip and knee flexion Bike  Hip ext, HSC anti-gravity   Mo 3-6  Full ROM  Gentle HS (S)    Mo 6-9        Access Code: [email or web address]  URL: [employer]/  Date: 09/21/2021  Prepared by: Ruth Campos    Exercises  Long Sitting Hamstring Set - Internal Rotation - 2-3 x daily - 7 x weekly - 2-3 sets - 10 reps  Long Sitting Hamstring Set - External Rotation - 2-3 x daily - 7 x weekly - 2-3 sets - 10 reps  Clamshell with Resistance - 2-3 x daily - 7 x weekly - 2-3 sets - 10 reps  Reverse Clamshell in Extension and Abduction - 2-3 x daily - 7 x weekly - 2-3 sets - 10 reps  Mini Squat - 2-3 x daily - 7 x weekly - 2-3 sets - 10 reps  side stepping with band around feet - 2-3 x daily - 7 x weekly - 2-3 sets - 10 reps    Exercises/Interventions:     Therapeutic Ex Sets/reps Notes        SLR  2x15 2#    SLR ABD 2x10    Step up training  Cues for TKE             CCTKE 2x10 black   LXX gastroc stretch :30x3         SB  flexion x25    HS isos 3D 90, 60 :5x5 each    Clam RTB  Rev clam + ADD x20  x20    Mini squat x20    LBW 10 ft x4 RTB             Seated tennis ball AROM knee ext mod range Ed for HEP              Manual Intervention     Assess lumbar spine     Knee ext mobs 4 min    t/HS STW, gastroc, popliteus     PROM hip, knee, ankle 4 min    Crutch sizing          NMR re-education     Foam roll march NV                                                Therapeutic Exercise and NMR EXR  [x] (59074) Provided verbal/tactile cueing for activities related to strengthening, flexibility, endurance, ROM for improvements in LE, proximal hip, and core control with self care, mobility, lifting, ambulation.  [] (11064) Provided verbal/tactile cueing for activities related to improving balance, coordination, kinesthetic sense, posture, motor skill, proprioception  to assist with LE, proximal hip, and core control in self care, mobility, lifting, ambulation and eccentric single leg control. NMR and Therapeutic Activities:    [x] (58174 or 90173) Provided verbal/tactile cueing for activities related to improving balance, coordination, kinesthetic sense, posture, motor skill, proprioception and motor activation to allow for proper function of core, proximal hip and LE with self care and ADLs  [] (35449) Gait Re-education- Provided training and instruction to the patient for proper LE, core and proximal hip recruitment and positioning and eccentric body weight control with ambulation re-education including up and down stairs     Home Exercise Program:    [x] (95242) Reviewed/Progressed HEP activities related to strengthening, flexibility, endurance, ROM of core, proximal hip and LE for functional self-care, mobility, lifting and ambulation/stair navigation   [] (34929)Reviewed/Progressed HEP activities related to improving balance, coordination, kinesthetic sense, posture, motor skill, proprioception of core, proximal hip and LE for self care, mobility, lifting, and ambulation/stair navigation      Manual Treatments:  PROM / STM / Oscillations-Mobs:  G-I, II, III, IV (PA's, Inf., Post.)  [x] (60982) Provided manual therapy to mobilize LE, proximal hip and/or LS spine soft tissue/joints for the purpose of modulating pain, promoting relaxation,  increasing ROM, reducing/eliminating soft tissue swelling/inflammation/restriction, improving soft tissue extensibility and allowing for proper ROM for normal function with self care, mobility, lifting and ambulation.      Modalities:        [] GR/ESU 15 min    [] GR 15 min  [] ESU     [] CP    [] MHP    [x] declined     Charges:  Timed Code Treatment Minutes: 43   Total Treatment Minutes: 43     [address] [facility] time in/time out:   (and requires time in and out for each CPT code)    [x] EVAL (LOW) 242 5237 (typically 20 minutes face-to-face)  [] EVAL (MOD) 91212 (typically 30 minutes face-to-face)  [] EVAL (HIGH) 50465 (typically 45 minutes face-to-face)  [] RE-EVAL     [x] ZA(12685) x 1     [] IONTO  [x] NMR (25659) x  1   [] VASO  [x] Manual (96121) x 1    [] Other:  [] TA x      [] Mech Traction (93618)  [] ES(attended) (73240)      [] ES (un) (01685):     GOALS:     Long Term Goals: To be achieved in: 12 weeks  1. Disability index score of 15% or less for the LEFS  to assist with reaching prior level of function. [] Progressing: [] Met: [x] Not Met: [] Adjusted  2. Patient will demonstrate increased AROM to WNL to allow for proper joint functioning as indicated by patients Functional Deficits. [] Progressing: [] Met: [x] Not Met: [] Adjusted  3. Patient will demonstrate an increase in Strength to good proximal hip and core activation to allow for proper functional mobility as indicated by patients Functional Deficits. [] Progressing: [] Met: [x] Not Met: [] Adjusted  4. Patient will return to stairs and squatting functional activities without increased symptoms or restriction. [] Progressing: [] Met: [x] Not Met: [] Adjusted  5. Pt will transition to workout routine; GAP return to running. [] Progressing: [] Met: [x] Not Met: [] Adjusted     Progression Towards Functional goals:  [] Patient is progressing as expected towards functional goals listed. [] Progression is slowed due to complexities listed. [] Progression has been slowed due to co-morbidities. [x] Plan just implemented, too soon to assess goals progression  [] Other:     Overall Progression Towards Functional goals/ Treatment Progress Update:  [] Patient is progressing as expected towards functional goals listed. [] Progression is slowed due to complexities/Impairments listed. [] Progression has been slowed due to co-morbidities.   [x] Plan just implemented, too soon to assess goals progression <30days   [] Goals require adjustment due to lack of progress  [] Patient is not progressing as expected and requires additional follow up with physician  [] Other    Prognosis for POC: [x] Good [] Fair  [] Poor      Patient requires continued skilled intervention: [x] Yes  [] No    Treatment/Activity Tolerance:  [x] Patient able to complete treatment  [] Patient limited by fatigue  [] Patient limited by pain    [] Patient limited by other medical complications  [] Other:     Assessment: Tolerated new TE well. Minimal strain incision with SB knee flexion. Noted hip rotator disuse weakness. Return to Play: (if applicable)   []  Stage 1: Intro to Strength   []  Stage 2: Return to Run and Strength   []  Stage 3: Return to Jump and Strength   []  Stage 4: Dynamic Strength and Agility   []  Stage 5: Sport Specific Training     []  Ready to Return to Play, Meets All Above Stages   []  Not Ready for Return to Sports   Comments:                               PLAN: See eval  [x] Continue per plan of care [] Alter current plan (see comments above)  [] Plan of care initiated [] Hold pending MD visit [] Discharge      Electronically signed by:  Hoa Yadav PT    Note: If patient does not return for scheduled/ recommended follow up visits, this note will serve as a discharge from care along with most recent update on progress.

## 2021-09-28 ENCOUNTER — HOSPITAL ENCOUNTER (OUTPATIENT)
Dept: PHYSICAL THERAPY | Age: 51
Setting detail: THERAPIES SERIES
Discharge: HOME | End: 2021-09-28
Payer: COMMERCIAL

## 2021-09-28 PROCEDURE — 97140 MANUAL THERAPY 1/> REGIONS: CPT | Performed by: PHYSICAL THERAPIST

## 2021-09-28 PROCEDURE — 97110 THERAPEUTIC EXERCISES: CPT | Performed by: PHYSICAL THERAPIST

## 2021-09-28 NOTE — PLAN OF CARE
The [facility] [place] [facility], [address] [facility], [address] [place], [place]      Physical Therapy Daily Treatment Note  Date:  2021    Patient Name:  Basia Angulo    :  1970  MRN: [id number]  Restrictions/Precautions:    Physician Information:  Referring Practitioner: Ramona Bartlett  Medical/Treatment Diagnosis Information:  · Diagnosis: W54.966H (ICD-10-CM) - Right hamstring injury, subsequent encounter s/p R HS proximal repair 2021  Treatment Diagnosis:  Right hip pain M25.551   [] Conservative / [] Surgical - DOS:    Insurance/Certification information:     Plan of care signed: [] YES  [] NO  Number of Comorbidities:  []0     [x]1-2    []3+  Date of Patient follow up with Physician:       Is this a Progress Report:     []  Yes  [x]  No        If Yes:  Date Range for reporting period:  Beginning 2021  Ending 2021    Progress report will be due (10 Rx or 30 days whichever is less):        Recertification will be due (POC Duration  / 90 days whichever is less): 2021        Visit # Insurance Allowable   6   30   Telehealth        Latex Allergy:  [x]NO      []YES  Preferred Language for Healthcare:   [x]English       []other:    SUBJECTIVE:  Doing well. Less strain with putting on shoes.   OBJECTIVE:    Initial Initial Current   VAS 3  0   LEFS 99%  40%   ROM LEFT RIGHT    HIP Flex  65 100   HIP Abd      HIP add      HIP Ext      HIP IR      HIP ER      Knee ext  -15 +4   Knee Flex  90 120   Ankle PF      Ankle DF      Ankle In      Ankle Ev      Strength  LEFT RIGHT    HIP Flexors  4 4+   HIP Abductors      HIP add      HIP Ext      Hip ER      Hip IR      Knee EXT (quad)  Fair activation in 15 degrees flexion Good-      Observation:    Test measurements:      RESTRICTIONS/PRECAUTIONS: see protocol media tab  Wk 5: 2021  Wk 6: 9/15/2021  Wk 10: 10/13/2021 **ankle weight PRE: 1# every week bridging SLR  Wk 12: 10/27/2021     Wk 0-4 2 weeks 50% WB  Knee ext block 15  Hip flexion <90    Wk 5-6 WBAT Hip flexion 90 No active HS, no active hip ext   Wk 6-12 No brace  Wean off crutches Active hip and knee flexion Bike  Hip ext, HSC anti-gravity   Mo 3-6  Full ROM  Gentle HS (S)    Mo 6-9        Access Code: [email or web address]  URL: [employer]/  Date: 09/21/2021  Prepared by: Shweta Force    Exercises  Long Sitting Hamstring Set - Internal Rotation - 2-3 x daily - 7 x weekly - 2-3 sets - 10 reps  Long Sitting Hamstring Set - External Rotation - 2-3 x daily - 7 x weekly - 2-3 sets - 10 reps  Clamshell with Resistance - 2-3 x daily - 7 x weekly - 2-3 sets - 10 reps  Reverse Clamshell in Extension and Abduction - 2-3 x daily - 7 x weekly - 2-3 sets - 10 reps  Mini Squat - 2-3 x daily - 7 x weekly - 2-3 sets - 10 reps  side stepping with band around feet - 2-3 x daily - 7 x weekly - 2-3 sets - 10 reps    Exercises/Interventions:     Therapeutic Ex Sets/reps Notes        SLR     SLR ABD    Plank progression Forward and side plank discussed for home              CCTKE 2x10 black   LXX gastroc stretch :30x3         SB  flexion x25    HS isos 3D 90, 60 :5x5 each    Clam RTB  Rev clam + ADD x20  x20    Mini squat x20    LBW 10 ft x4 RTB             Seated tennis ball AROM knee ext mod range Ed for HEP              Manual Intervention     Assess lumbar spine     Knee ext mobs 4 min    t/HS STW, gastroc, popliteus     PROM hip, knee, ankle 4 min    Crutch sizing          NMR re-education     Foam roll march NV                                                Therapeutic Exercise and NMR EXR  [x] (50065) Provided verbal/tactile cueing for activities related to strengthening, flexibility, endurance, ROM for improvements in LE, proximal hip, and core control with self care, mobility, lifting, ambulation.  [] (42200) Provided verbal/tactile cueing for activities related to improving balance, coordination, kinesthetic sense, posture, motor skill, proprioception  to assist with LE, proximal hip, and core control in self care, mobility, lifting, ambulation and eccentric single leg control. NMR and Therapeutic Activities:    [x] (75583 or 10309) Provided verbal/tactile cueing for activities related to improving balance, coordination, kinesthetic sense, posture, motor skill, proprioception and motor activation to allow for proper function of core, proximal hip and LE with self care and ADLs  [] (74177) Gait Re-education- Provided training and instruction to the patient for proper LE, core and proximal hip recruitment and positioning and eccentric body weight control with ambulation re-education including up and down stairs     Home Exercise Program:    [x] (92512) Reviewed/Progressed HEP activities related to strengthening, flexibility, endurance, ROM of core, proximal hip and LE for functional self-care, mobility, lifting and ambulation/stair navigation   [] (54195)Reviewed/Progressed HEP activities related to improving balance, coordination, kinesthetic sense, posture, motor skill, proprioception of core, proximal hip and LE for self care, mobility, lifting, and ambulation/stair navigation      Manual Treatments:  PROM / STM / Oscillations-Mobs:  G-I, II, III, IV (PA's, Inf., Post.)  [x] (38022) Provided manual therapy to mobilize LE, proximal hip and/or LS spine soft tissue/joints for the purpose of modulating pain, promoting relaxation,  increasing ROM, reducing/eliminating soft tissue swelling/inflammation/restriction, improving soft tissue extensibility and allowing for proper ROM for normal function with self care, mobility, lifting and ambulation.      Modalities:        [] GR/ESU 15 min    [] GR 15 min  [] ESU     [] CP    [] MHP    [x] declined     Charges:  Timed Code Treatment Minutes: 43   Total Treatment Minutes: 43     [facility] time in/time out:   (and requires time in and out for each CPT code)    [x] EVAL (LOW) 455 1011 (typically 20 minutes face-to-face)  [] EVAL (MOD) 79485 (typically 30 minutes face-to-face)  [] EVAL (HIGH) 82896 (typically 45 minutes face-to-face)  [] RE-EVAL     [x] OF(20660) x 1     [] IONTO  [x] NMR (92022) x  1   [] VASO  [x] Manual (22664) x 1    [] Other:  [] TA x      [] Mech Traction (55538)  [] ES(attended) (19978)      [] ES (un) (07317):     GOALS:     Long Term Goals: To be achieved in: 12 weeks  1. Disability index score of 15% or less for the LEFS  to assist with reaching prior level of function. [] Progressing: [] Met: [x] Not Met: [] Adjusted  2. Patient will demonstrate increased AROM to WNL to allow for proper joint functioning as indicated by patients Functional Deficits. [] Progressing: [] Met: [x] Not Met: [] Adjusted  3. Patient will demonstrate an increase in Strength to good proximal hip and core activation to allow for proper functional mobility as indicated by patients Functional Deficits. [] Progressing: [] Met: [x] Not Met: [] Adjusted  4. Patient will return to stairs and squatting functional activities without increased symptoms or restriction. [] Progressing: [] Met: [x] Not Met: [] Adjusted  5. Pt will transition to workout routine; GAP return to running. [] Progressing: [] Met: [x] Not Met: [] Adjusted     Progression Towards Functional goals:  [] Patient is progressing as expected towards functional goals listed. [] Progression is slowed due to complexities listed. [] Progression has been slowed due to co-morbidities. [x] Plan just implemented, too soon to assess goals progression  [] Other:     Overall Progression Towards Functional goals/ Treatment Progress Update:  [] Patient is progressing as expected towards functional goals listed. [] Progression is slowed due to complexities/Impairments listed. [] Progression has been slowed due to co-morbidities.   [x] Plan just implemented, too soon to assess goals progression <30days   [] Goals require adjustment due to lack of progress  [] Patient is not progressing as expected and requires additional follow up with physician  [] Other    Prognosis for POC: [x] Good [] Fair  [] Poor      Patient requires continued skilled intervention: [x] Yes  [] No    Treatment/Activity Tolerance:  [x] Patient able to complete treatment  [] Patient limited by fatigue  [] Patient limited by pain    [] Patient limited by other medical complications  [] Other:     Assessment: Tolerated new TE well. Minimal strain incision with SB knee flexion. Noted hip rotator disuse weakness. Return to Play: (if applicable)   []  Stage 1: Intro to Strength   []  Stage 2: Return to Run and Strength   []  Stage 3: Return to Jump and Strength   []  Stage 4: Dynamic Strength and Agility   []  Stage 5: Sport Specific Training     []  Ready to Return to Play, Meets All Above Stages   []  Not Ready for Return to Sports   Comments:                               PLAN: See eval  [x] Continue per plan of care [] Alter current plan (see comments above)  [] Plan of care initiated [] Hold pending MD visit [] Discharge      Electronically signed by:  Zach Paul PT    Note: If patient does not return for scheduled/ recommended follow up visits, this note will serve as a discharge from care along with most recent update on progress.

## 2021-09-30 ENCOUNTER — HOSPITAL ENCOUNTER (OUTPATIENT)
Dept: PHYSICAL THERAPY | Age: 51
Setting detail: THERAPIES SERIES
Discharge: HOME | End: 2021-09-30
Payer: COMMERCIAL

## 2021-09-30 PROCEDURE — 97110 THERAPEUTIC EXERCISES: CPT | Performed by: PHYSICAL THERAPIST

## 2021-09-30 PROCEDURE — 97112 NEUROMUSCULAR REEDUCATION: CPT | Performed by: PHYSICAL THERAPIST

## 2021-09-30 PROCEDURE — 97140 MANUAL THERAPY 1/> REGIONS: CPT | Performed by: PHYSICAL THERAPIST

## 2021-09-30 NOTE — FLOWSHEET NOTE
The [address] [facility], [address] [facility], [address], [place]      Physical Therapy Daily Treatment Note  Date:  2021    Patient Name:  Kash Elaine    :  1970  MRN: [id number]  Restrictions/Precautions:    Physician Information:  Referring Practitioner: Greg Reyes  Medical/Treatment Diagnosis Information:  · Diagnosis: C66.958K (ICD-10-CM) - Right hamstring injury, subsequent encounter s/p R HS proximal repair 2021  Treatment Diagnosis:  Right hip pain M25.551   [] Conservative / [] Surgical - DOS:    Insurance/Certification information:     Plan of care signed: [] YES  [] NO  Number of Comorbidities:  []0     [x]1-2    []3+  Date of Patient follow up with Physician:       Is this a Progress Report:     []  Yes  [x]  No        If Yes:  Date Range for reporting period:  Beginning 2021  Ending 2021    Progress report will be due (10 Rx or 30 days whichever is less):        Recertification will be due (POC Duration  / 90 days whichever is less): 2021        Visit # Insurance Allowable   7   30   Telehealth        Latex Allergy:  [x]NO      []YES  Preferred Language for Healthcare:   [x]English       []other:    SUBJECTIVE:  Doing well. Less strain with putting on shoes.   OBJECTIVE:    Initial Initial Current   VAS 3  0   LEFS 99%  40%   ROM LEFT RIGHT    HIP Flex  65 100   HIP Abd      HIP add      HIP Ext      HIP IR      HIP ER      Knee ext  -15 +4   Knee Flex  90 120   Ankle PF      Ankle DF      Ankle In      Ankle Ev      Strength  LEFT RIGHT    HIP Flexors  4 4+   HIP Abductors      HIP add      HIP Ext      Hip ER      Hip IR      Knee EXT (quad)  Fair activation in 15 degrees flexion Good-      Observation:    Test measurements:      RESTRICTIONS/PRECAUTIONS: see protocol media tab  Wk 5: 2021  Wk 6: 9/15/2021  Wk 10: 10/13/2021 **ankle weight PRE: 1# every week bridging SLR  Wk 12: 10/27/2021     Wk 0-4 2 weeks 50% WB  Knee ext block 15  Hip flexion <90    Wk 5-6 WBAT Hip flexion 90 No active HS, no active hip ext   Wk 6-12 No brace  Wean off crutches Active hip and knee flexion Bike  Hip ext, HSC anti-gravity   Mo 3-6  Full ROM  Gentle HS (S)    Mo 6-9        Access Code: [email or web address]  URL: [email or web address]  Date: 09/21/2021  Prepared by: Ashly Hsu    Exercises  Long Sitting Hamstring Set - Internal Rotation - 2-3 x daily - 7 x weekly - 2-3 sets - 10 reps  Long Sitting Hamstring Set - External Rotation - 2-3 x daily - 7 x weekly - 2-3 sets - 10 reps  Clamshell with Resistance - 2-3 x daily - 7 x weekly - 2-3 sets - 10 reps  Reverse Clamshell in Extension and Abduction - 2-3 x daily - 7 x weekly - 2-3 sets - 10 reps  Mini Squat - 2-3 x daily - 7 x weekly - 2-3 sets - 10 reps  side stepping with band around feet - 2-3 x daily - 7 x weekly - 2-3 sets - 10 reps    Exercises/Interventions:     Therapeutic Ex Sets/reps Notes   TB ext alt march 2x10 BTB   SLR + TB ext x15BTB   SLR ABD    Plank progression Forward and side plank discussed for home    Steamboats ABD, flexion, ADD x10 each R/L GTB   LXX, LXE squat  Elevated 4 in , 6 in x8 each    CCTKE  black   LXX gastroc stretch :30x3         SB  flexion    HS isos 3D 90, 60    Clam RTB  Rev clam + ADD    Mini squat    LBW RTB             Seated tennis ball AROM knee ext mod range Ed for HEP              Manual Intervention     Assess lumbar spine     Knee ext mobs 4 min    t/HS STW, gastroc, popliteus     PROM hip, knee, ankle 4 min    Crutch sizing          NMR re-education     Foam roll march                                                 Therapeutic Exercise and NMR EXR  [x] (05570) Provided verbal/tactile cueing for activities related to strengthening, flexibility, endurance, ROM for improvements in LE, proximal hip, and core control with self care, mobility, lifting, ambulation.  [] (67039) Provided verbal/tactile cueing for activities related to for each CPT code)    [x] EVAL (LOW) 73611 (typically 20 minutes face-to-face)  [] EVAL (MOD) 88997 (typically 30 minutes face-to-face)  [] EVAL (HIGH) 93057 (typically 45 minutes face-to-face)  [] RE-EVAL     [x] UK(28581) x 1     [] IONTO  [x] NMR (42646) x  1   [] VASO  [x] Manual (82732) x 1    [] Other:  [] TA x      [] Mech Traction (00073)  [] ES(attended) (81486)      [] ES (un) (76932):     GOALS:     Long Term Goals: To be achieved in: 12 weeks  1. Disability index score of 15% or less for the LEFS  to assist with reaching prior level of function. [] Progressing: [] Met: [x] Not Met: [] Adjusted  2. Patient will demonstrate increased AROM to WNL to allow for proper joint functioning as indicated by patients Functional Deficits. [] Progressing: [] Met: [x] Not Met: [] Adjusted  3. Patient will demonstrate an increase in Strength to good proximal hip and core activation to allow for proper functional mobility as indicated by patients Functional Deficits. [] Progressing: [] Met: [x] Not Met: [] Adjusted  4. Patient will return to stairs and squatting functional activities without increased symptoms or restriction. [] Progressing: [] Met: [x] Not Met: [] Adjusted  5. Pt will transition to workout routine; GAP return to running. [] Progressing: [] Met: [x] Not Met: [] Adjusted     Progression Towards Functional goals:  [] Patient is progressing as expected towards functional goals listed. [] Progression is slowed due to complexities listed. [] Progression has been slowed due to co-morbidities. [x] Plan just implemented, too soon to assess goals progression  [] Other:     Overall Progression Towards Functional goals/ Treatment Progress Update:  [] Patient is progressing as expected towards functional goals listed. [] Progression is slowed due to complexities/Impairments listed. [] Progression has been slowed due to co-morbidities.   [x] Plan just implemented, too soon to assess goals progression <30days   [] Goals require adjustment due to lack of progress  [] Patient is not progressing as expected and requires additional follow up with physician  [] Other    Prognosis for POC: [x] Good [] Fair  [] Poor      Patient requires continued skilled intervention: [x] Yes  [] No    Treatment/Activity Tolerance:  [x] Patient able to complete treatment  [] Patient limited by fatigue  [] Patient limited by pain    [] Patient limited by other medical complications  [] Other:     Assessment: Tolerated new TE well. Fatigue with ADD TB steamboat and challenged bu TB march d/t balance deficits. ASLR ~80 L    Return to Play: (if applicable)   []  Stage 1: Intro to Strength   []  Stage 2: Return to Run and Strength   []  Stage 3: Return to Jump and Strength   []  Stage 4: Dynamic Strength and Agility   []  Stage 5: Sport Specific Training     []  Ready to Return to Play, Meets All Above Stages   []  Not Ready for Return to Sports   Comments:                               PLAN: See eval  [x] Continue per plan of care [] Alter current plan (see comments above)  [] Plan of care initiated [] Hold pending MD visit [] Discharge      Electronically signed by:  Marion Ruano PT    Note: If patient does not return for scheduled/ recommended follow up visits, this note will serve as a discharge from care along with most recent update on progress.

## 2021-10-04 ENCOUNTER — HOSPITAL ENCOUNTER (OUTPATIENT)
Dept: PHYSICAL THERAPY | Age: 51
Setting detail: THERAPIES SERIES
Discharge: HOME | End: 2021-10-04
Payer: COMMERCIAL

## 2021-10-04 PROCEDURE — 97140 MANUAL THERAPY 1/> REGIONS: CPT | Performed by: PHYSICAL THERAPIST

## 2021-10-04 PROCEDURE — 97110 THERAPEUTIC EXERCISES: CPT | Performed by: PHYSICAL THERAPIST

## 2021-10-04 PROCEDURE — 97112 NEUROMUSCULAR REEDUCATION: CPT | Performed by: PHYSICAL THERAPIST

## 2021-10-04 NOTE — FLOWSHEET NOTE
[address] [facility] [address]      Physical Therapy Daily Treatment Note  Date:  10/4/2021    Patient Name:  Noah Morales    :  1970  MRN: [id number]  Restrictions/Precautions:    Physician Information:  Referring Practitioner: Gerardo Velasco  Medical/Treatment Diagnosis Information:  · Diagnosis: Y61.569K (ICD-10-CM) - Right hamstring injury, subsequent encounter s/p R HS proximal repair 2021   Treatment Diagnosis:  Right hip pain M25.551   [] Conservative / [] Surgical - DOS:    Insurance/Certification information:     Plan of care signed: [] YES  [] NO  Number of Comorbidities:  []0     [x]1-2    []3+  Date of Patient follow up with Physician:       Is this a Progress Report:     []  Yes  [x]  No        If Yes:  Date Range for reporting period:  Beginning 2021  Ending 2021    Progress report will be due (10 Rx or 30 days whichever is less):        Recertification will be due (POC Duration  / 90 days whichever is less): 2021        Visit # Insurance Allowable   8   30   Telehealth        Latex Allergy:  [x]NO      []YES  Preferred Language for Healthcare:   [x]English       []other:    SUBJECTIVE:  Doing well; almost able to reach toes with ease.   OBJECTIVE:    Initial Initial Current   VAS 3  0   LEFS 99%  40%   ROM LEFT RIGHT    HIP Flex  65 100   HIP Abd      HIP add      HIP Ext      HIP IR      HIP ER      Knee ext  -15 +4   Knee Flex  90 120   Ankle PF      Ankle DF      Ankle In      Ankle Ev      Strength  LEFT RIGHT    HIP Flexors  4 4+   HIP Abductors      HIP add      HIP Ext      Hip ER      Hip IR      Knee EXT (quad)  Fair activation in 15 degrees flexion Good-      Observation:    Test measurements:      RESTRICTIONS/PRECAUTIONS: see protocol media tab  Wk 5: 2021  Wk 6: 9/15/2021  Wk 10: 10/13/2021 **ankle weight PRE: 1# every week bridging SLR  Wk 12: 10/27/2021     Wk 0-4 2 weeks 50% WB  Knee ext block 15  Hip flexion <90    Wk 5-6 WBAT Hip flexion 90 No active HS, no active hip ext   Wk 6-12 No brace  Wean off crutches Active hip and knee flexion Bike  Hip ext, HSC anti-gravity   Mo 3-6  Full ROM  Gentle HS (S)    Mo 6-9      Access Code: [email or web address]  URL: [employer]/  Date: 10/05/2021  Prepared by: Fabricio Hernadez    Exercises  Standing Hip Abduction Kicks - 2-3 x daily - 7 x weekly - 2-3 sets - 10 reps  Standing Hip Adduction with Anchored Resistance - 2-3 x daily - 7 x weekly - 2-3 sets - 10 reps  Standing Hip Flexion with Resistance Loop - 2-3 x daily - 7 x weekly - 2-3 sets - 10 reps  Staggered Stance Squat - 2-3 x daily - 7 x weekly - 2-3 sets - 10 reps  Lunge Matrix - 2-3 x daily - 7 x weekly - 2-3 sets - 10 reps  Eccentric Heel Lowering on Step - 2-3 x daily - 7 x weekly - 2-3 sets - 10 reps  Single Leg Balance with Clock Reach - 2-3 x daily - 7 x weekly - 2-3 sets - 10 reps      Access Code: [email or web address]  URL: [employer]/  Date: 10/05/2021  Prepared by: Fabricio Hernadez    Exercises  Half Kneeling Hip Flexor Stretch with Posterior Pelvic Tilt and Dowel - 2-3 x daily - 7 x weekly - 2-3 sets - 10 reps  Half Kneeling Hip Flexor Stretch with Sidebend - 2-3 x daily - 7 x weekly - 2-3 sets - 10 reps  Supine Bilateral Hamstring Sets - 2-3 x daily - 7 x weekly - 2-3 sets - 10 reps  Sidelying Hip Abduction with Resistance at Thighs - 2-3 x daily - 7 x weekly - 2-3 sets - 10 reps  Sidelying Feet Elevated Clamshells - 2-3 x daily - 7 x weekly - 2-3 sets - 10 reps  Forward Monster Walks - 2-3 x daily - 7 x weekly - 2-3 sets - 10 reps  Squat with Chest Press - 2-3 x daily - 7 x weekly - 2-3 sets - 10 reps  Standard Plank - 2-3 x daily - 7 x weekly - 2-3 sets - 10 reps    Access Code: [email or web address]  URL: [email or web address]  Date: 10/05/2021  Prepared by: Fabriico Hernadez    Exercises  Half Kneeling Hip Flexor Stretch with Posterior Pelvic Tilt and Dowel - training and instruction to the patient for proper LE, core and proximal hip recruitment and positioning and eccentric body weight control with ambulation re-education including up and down stairs     Home Exercise Program:    [x] (19705) Reviewed/Progressed HEP activities related to strengthening, flexibility, endurance, ROM of core, proximal hip and LE for functional self-care, mobility, lifting and ambulation/stair navigation   [] (23721)Reviewed/Progressed HEP activities related to improving balance, coordination, kinesthetic sense, posture, motor skill, proprioception of core, proximal hip and LE for self care, mobility, lifting, and ambulation/stair navigation      Manual Treatments:  PROM / STM / Oscillations-Mobs:  G-I, II, III, IV (PA's, Inf., Post.)  [x] (33870) Provided manual therapy to mobilize LE, proximal hip and/or LS spine soft tissue/joints for the purpose of modulating pain, promoting relaxation,  increasing ROM, reducing/eliminating soft tissue swelling/inflammation/restriction, improving soft tissue extensibility and allowing for proper ROM for normal function with self care, mobility, lifting and ambulation. Modalities:        [] GR/ESU 15 min    [] GR 15 min  [] ESU     [] CP    [] MHP    [x] declined     Charges:  Timed Code Treatment Minutes: 43   Total Treatment Minutes: 43     C time in/time out:   (and requires time in and out for each CPT code)    [x] EVAL (LOW) 55103 (typically 20 minutes face-to-face)  [] EVAL (MOD) 72006 (typically 30 minutes face-to-face)  [] EVAL (HIGH) 42989 (typically 45 minutes face-to-face)  [] RE-EVAL     [x] HG(17515) x 1     [] IONTO  [x] NMR (40783) x  1   [] VASO  [x] Manual (25055) x 1    [] Other:  [] TA x      [] Mech Traction (74032)  [] ES(attended) (46512)      [] ES (un) (60051):     GOALS:     Long Term Goals: To be achieved in: 12 weeks  1. Disability index score of 15% or less for the LEFS  to assist with reaching prior level of function. questions answered/    Return to Play: (if applicable)   []  Stage 1: Intro to Strength   []  Stage 2: Return to Run and Strength   []  Stage 3: Return to Jump and Strength   []  Stage 4: Dynamic Strength and Agility   []  Stage 5: Sport Specific Training     []  Ready to Return to Play, Meets All Above Stages   []  Not Ready for Return to Sports   Comments:                               PLAN: See eval  [x] Continue per plan of care [] Alter current plan (see comments above)  [] Plan of care initiated [] Hold pending MD visit [] Discharge      Electronically signed by:  Oksana Roy PT    Note: If patient does not return for scheduled/ recommended follow up visits, this note will serve as a discharge from care along with most recent update on progress.

## 2021-10-07 ENCOUNTER — HOSPITAL ENCOUNTER (OUTPATIENT)
Dept: PHYSICAL THERAPY | Age: 51
Setting detail: THERAPIES SERIES
Discharge: HOME | End: 2021-10-07
Payer: COMMERCIAL

## 2021-10-07 PROCEDURE — 97140 MANUAL THERAPY 1/> REGIONS: CPT | Performed by: PHYSICAL THERAPIST

## 2021-10-07 PROCEDURE — 97112 NEUROMUSCULAR REEDUCATION: CPT | Performed by: PHYSICAL THERAPIST

## 2021-10-07 PROCEDURE — 97110 THERAPEUTIC EXERCISES: CPT | Performed by: PHYSICAL THERAPIST

## 2021-10-07 NOTE — FLOWSHEET NOTE
[address] [facility] [address]      Physical Therapy Daily Treatment Note  Date:  10/7/2021    Patient Name:  Crissy Jack    :  1970  MRN: [id number]  Restrictions/Precautions:    Physician Information:  Referring Practitioner: Boogie Johns  Medical/Treatment Diagnosis Information:  · Diagnosis: I21.691M (ICD-10-CM) - Right hamstring injury, subsequent encounter s/p R HS proximal repair 2021   Treatment Diagnosis:  Right hip pain M25.551   [] Conservative / [] Surgical - DOS:    Insurance/Certification information:     Plan of care signed: [] YES  [] NO  Number of Comorbidities:  []0     [x]1-2    []3+  Date of Patient follow up with Physician:       Is this a Progress Report:     []  Yes  [x]  No        If Yes:  Date Range for reporting period:  Beginning 2021  Ending 2021    Progress report will be due (10 Rx or 30 days whichever is less):        Recertification will be due (POC Duration  / 90 days whichever is less): 2021        Visit # Insurance Allowable   9 30   Telehealth        Latex Allergy:  [x]NO      []YES  Preferred Language for Healthcare:   [x]English       []other:    SUBJECTIVE:  Doing well; almost able to reach toes with ease.   OBJECTIVE:    Initial Initial Current   VAS 3  0   LEFS 99%  40%   ROM LEFT RIGHT    HIP Flex  65 100   HIP Abd      HIP add      HIP Ext      HIP IR      HIP ER      Knee ext  -15 +4   Knee Flex  90 120   Ankle PF      Ankle DF      Ankle In      Ankle Ev      Strength  LEFT RIGHT    HIP Flexors  4 4+   HIP Abductors      HIP add      HIP Ext      Hip ER      Hip IR      Knee EXT (quad)  Fair activation in 15 degrees flexion Good-      Observation:    Test measurements:      RESTRICTIONS/PRECAUTIONS: see protocol media tab  Wk 5: 2021  Wk 6: 9/15/2021  Wk 10: 10/13/2021 **ankle weight PRE: 1# every week bridging SLR  Wk 12: 10/27/2021     Wk 0-4 2 weeks 50% WB  Knee ext block 15  Hip flexion <90    Wk 5-6 WBAT Hip flexion 90 No active HS, no active hip ext   Wk 6-12 No brace  Wean off crutches Active hip and knee flexion Bike  Hip ext, HSC anti-gravity   Mo 3-6  Full ROM  Gentle HS (S)    Mo 6-9      Access Code: [email or web address]  URL: [employer]/  Date: 10/05/2021  Prepared by: Margret Piper    Exercises  Standing Hip Abduction Kicks - 2-3 x daily - 7 x weekly - 2-3 sets - 10 reps  Standing Hip Adduction with Anchored Resistance - 2-3 x daily - 7 x weekly - 2-3 sets - 10 reps  Standing Hip Flexion with Resistance Loop - 2-3 x daily - 7 x weekly - 2-3 sets - 10 reps  Staggered Stance Squat - 2-3 x daily - 7 x weekly - 2-3 sets - 10 reps  Lunge Matrix - 2-3 x daily - 7 x weekly - 2-3 sets - 10 reps  Eccentric Heel Lowering on Step - 2-3 x daily - 7 x weekly - 2-3 sets - 10 reps  Single Leg Balance with Clock Reach - 2-3 x daily - 7 x weekly - 2-3 sets - 10 reps      Access Code: [email or web address]  URL: [employer]/  Date: 10/05/2021  Prepared by: Margret Piper    Exercises  Half Kneeling Hip Flexor Stretch with Posterior Pelvic Tilt and Dowel - 2-3 x daily - 7 x weekly - 2-3 sets - 10 reps  Half Kneeling Hip Flexor Stretch with Sidebend - 2-3 x daily - 7 x weekly - 2-3 sets - 10 reps  Supine Bilateral Hamstring Sets - 2-3 x daily - 7 x weekly - 2-3 sets - 10 reps  Sidelying Hip Abduction with Resistance at Thighs - 2-3 x daily - 7 x weekly - 2-3 sets - 10 reps  Sidelying Feet Elevated Clamshells - 2-3 x daily - 7 x weekly - 2-3 sets - 10 reps  Forward Monster Walks - 2-3 x daily - 7 x weekly - 2-3 sets - 10 reps  Squat with Chest Press - 2-3 x daily - 7 x weekly - 2-3 sets - 10 reps  Standard Plank - 2-3 x daily - 7 x weekly - 2-3 sets - 10 reps    Access Code: [email or web address]  URL: [email or web address]  Date: 10/05/2021  Prepared by: Margret Piper    Exercises  Half Kneeling Hip Flexor Stretch with Posterior Pelvic Tilt and Dowel - 2-3 x daily - 7 x weekly - 2-3 sets - 10 reps  Half Kneeling Hip Flexor Stretch with Sidebend - 2-3 x daily - 7 x weekly - 2-3 sets - 10 reps  Supine Bilateral Hamstring Sets - 2-3 x daily - 7 x weekly - 2-3 sets - 10 reps  Sidelying Hip Abduction with Resistance at Thighs - 2-3 x daily - 7 x weekly - 2-3 sets - 10 reps  Sidelying Feet Elevated Clamshells - 2-3 x daily - 7 x weekly - 2-3 sets - 10 reps  Forward Monster Walks - 2-3 x daily - 7 x weekly - 2-3 sets - 10 reps  Squat with Chest Press - 2-3 x daily - 7 x weekly - 2-3 sets - 10 reps  Standard Plank - 2-3 x daily - 7 x weekly - 2-3 sets - 10 reps    Exercises/Interventions:     Therapeutic Ex Sets/reps Notes   HEP instruction as above 30 min         Quadruped rock neutral, IR x10 each     Bird dog hip shift airex  Bird dog hip ext L x10 R/L  2x10    Hip hinge cook band [id number] for posterior hip shift   SB flexion  SB hip ext (short lever SB)  SB flexion HS iso 90 2x10  x10  :10x10                 Seated tennis ball AROM knee ext mod range Ed for HEP              Manual Intervention     CFM HS origin/scar tissue     Knee ext mobs     t/HS STW, gastroc, popliteus     IASTM sweeps medial and lateral HS 10 min              NMR re-education     SLS airex :20x5                                                Therapeutic Exercise and NMR EXR  [x] (40879) Provided verbal/tactile cueing for activities related to strengthening, flexibility, endurance, ROM for improvements in LE, proximal hip, and core control with self care, mobility, lifting, ambulation.  [] (45535) Provided verbal/tactile cueing for activities related to improving balance, coordination, kinesthetic sense, posture, motor skill, proprioception  to assist with LE, proximal hip, and core control in self care, mobility, lifting, ambulation and eccentric single leg control.      NMR and Therapeutic Activities:    [x] (54791 or 97732) Provided verbal/tactile cueing for activities related to improving balance, coordination, kinesthetic sense, posture, motor skill, proprioception and motor activation to allow for proper function of core, proximal hip and LE with self care and ADLs  [] (40949) Gait Re-education- Provided training and instruction to the patient for proper LE, core and proximal hip recruitment and positioning and eccentric body weight control with ambulation re-education including up and down stairs     Home Exercise Program:    [x] (31687) Reviewed/Progressed HEP activities related to strengthening, flexibility, endurance, ROM of core, proximal hip and LE for functional self-care, mobility, lifting and ambulation/stair navigation   [] (13968)Reviewed/Progressed HEP activities related to improving balance, coordination, kinesthetic sense, posture, motor skill, proprioception of core, proximal hip and LE for self care, mobility, lifting, and ambulation/stair navigation      Manual Treatments:  PROM / STM / Oscillations-Mobs:  G-I, II, III, IV (PA's, Inf., Post.)  [x] (81532) Provided manual therapy to mobilize LE, proximal hip and/or LS spine soft tissue/joints for the purpose of modulating pain, promoting relaxation,  increasing ROM, reducing/eliminating soft tissue swelling/inflammation/restriction, improving soft tissue extensibility and allowing for proper ROM for normal function with self care, mobility, lifting and ambulation.      Modalities:        [] GR/ESU 15 min    [] GR 15 min  [] ESU     [] CP    [] MHP    [x] declined     Charges:  Timed Code Treatment Minutes: 43   Total Treatment Minutes: 43     B[facility] time in/time out:   (and requires time in and out for each CPT code)    [x] EVAL (LOW) 35268 (typically 20 minutes face-to-face)  [] EVAL (MOD) 25666 (typically 30 minutes face-to-face)  [] EVAL (HIGH) 90306 (typically 45 minutes face-to-face)  [] RE-EVAL     [x] AA(89661) x 1     [] IONTO  [x] NMR (06396) x  1   [] VASO  [x] Manual (77122) x 1    [] Other:  [] TA x      [] Mech Traction (56339)  [] ES(attended) (03604)      [] ES (un) (77475):     GOALS:     Long Term Goals: To be achieved in: 12 weeks  1. Disability index score of 15% or less for the LEFS  to assist with reaching prior level of function. [] Progressing: [] Met: [x] Not Met: [] Adjusted  2. Patient will demonstrate increased AROM to WNL to allow for proper joint functioning as indicated by patients Functional Deficits. [] Progressing: [] Met: [x] Not Met: [] Adjusted  3. Patient will demonstrate an increase in Strength to good proximal hip and core activation to allow for proper functional mobility as indicated by patients Functional Deficits. [] Progressing: [] Met: [x] Not Met: [] Adjusted  4. Patient will return to stairs and squatting functional activities without increased symptoms or restriction. [] Progressing: [] Met: [x] Not Met: [] Adjusted  5. Pt will transition to workout routine; GAP return to running. [] Progressing: [] Met: [x] Not Met: [] Adjusted     Progression Towards Functional goals:  [] Patient is progressing as expected towards functional goals listed. [] Progression is slowed due to complexities listed. [] Progression has been slowed due to co-morbidities. [x] Plan just implemented, too soon to assess goals progression  [] Other:     Overall Progression Towards Functional goals/ Treatment Progress Update:  [] Patient is progressing as expected towards functional goals listed. [] Progression is slowed due to complexities/Impairments listed. [] Progression has been slowed due to co-morbidities.   [x] Plan just implemented, too soon to assess goals progression <30days   [] Goals require adjustment due to lack of progress  [] Patient is not progressing as expected and requires additional follow up with physician  [] Other    Prognosis for POC: [x] Good [] Fair  [] Poor      Patient requires continued skilled intervention: [x] Yes  [] No    Treatment/Activity Tolerance:  [x] Patient able to

## 2021-10-11 ENCOUNTER — HOSPITAL ENCOUNTER (OUTPATIENT)
Dept: PHYSICAL THERAPY | Age: 51
Setting detail: THERAPIES SERIES
Discharge: HOME | End: 2021-10-11
Payer: COMMERCIAL

## 2021-10-11 PROCEDURE — 97140 MANUAL THERAPY 1/> REGIONS: CPT | Performed by: PHYSICAL THERAPIST

## 2021-10-11 PROCEDURE — 97110 THERAPEUTIC EXERCISES: CPT | Performed by: PHYSICAL THERAPIST

## 2021-10-11 PROCEDURE — 97112 NEUROMUSCULAR REEDUCATION: CPT | Performed by: PHYSICAL THERAPIST

## 2021-10-11 NOTE — FLOWSHEET NOTE
[facility], [address] [facility], [address] [facility]      Physical Therapy Daily Treatment Note  Date:  10/11/2021    Patient Name:  Javier Cotton    :  1970  MRN: [id number]  Restrictions/Precautions:    Physician Information:  Referring Practitioner: Jacinta Flores  Medical/Treatment Diagnosis Information:  · Diagnosis: G68.455Z (ICD-10-CM) - Right hamstring injury, subsequent encounter s/p R HS proximal repair 2021   Treatment Diagnosis:  Right hip pain M25.551   [] Conservative / [] Surgical - DOS:    Insurance/Certification information:     Plan of care signed: [] YES  [] NO  Number of Comorbidities:  []0     [x]1-2    []3+  Date of Patient follow up with Physician:       Is this a Progress Report:     []  Yes  [x]  No        If Yes:  Date Range for reporting period:  Beginning 2021  Ending 2021    Progress report will be due (10 Rx or 30 days whichever is less):        Recertification will be due (POC Duration  / 90 days whichever is less): 2021        Visit # Insurance Allowable   10 30   Telehealth        Latex Allergy:  [x]NO      []YES  Preferred Language for Healthcare:   [x]English       []other:    SUBJECTIVE:  Only tightness is with sit to stand.   OBJECTIVE:    Initial Initial Current   VAS 3  0   LEFS 99%  40%   ROM LEFT RIGHT    HIP Flex  65 100   HIP Abd      HIP add      HIP Ext      HIP IR      HIP ER      Knee ext  -15 +4   Knee Flex  90 120   Ankle PF      Ankle DF      Ankle In      Ankle Ev      Strength  LEFT RIGHT    HIP Flexors  4 4+   HIP Abductors      HIP add      HIP Ext      Hip ER      Hip IR      Knee EXT (quad)  Fair activation in 15 degrees flexion Good-      Observation:    Test measurements:      RESTRICTIONS/PRECAUTIONS: see protocol media tab  Wk 5: 2021  Wk 6: 9/15/2021  Wk 10: 10/13/2021 **ankle weight PRE: 1# every week bridging SLR  Wk 12: 10/27/2021     Wk 0-4 2 weeks 50% WB  Knee ext block 15  Hip flexion <90    Wk 5-6 WBAT Hip flexion 90 No active HS, no active hip ext   Wk 6-12 No brace  Wean off crutches Active hip and knee flexion Bike  Hip ext, HSC anti-gravity   Mo 3-6  Full ROM  Gentle HS (S)    Mo 6-9      Access Code: [email or web address]  URL: [email or web address]  Date: 10/05/2021  Prepared by: Edward Tate    Exercises  Standing Hip Abduction Kicks - 2-3 x daily - 7 x weekly - 2-3 sets - 10 reps  Standing Hip Adduction with Anchored Resistance - 2-3 x daily - 7 x weekly - 2-3 sets - 10 reps  Standing Hip Flexion with Resistance Loop - 2-3 x daily - 7 x weekly - 2-3 sets - 10 reps  Staggered Stance Squat - 2-3 x daily - 7 x weekly - 2-3 sets - 10 reps  Lunge Matrix - 2-3 x daily - 7 x weekly - 2-3 sets - 10 reps  Eccentric Heel Lowering on Step - 2-3 x daily - 7 x weekly - 2-3 sets - 10 reps  Single Leg Balance with Clock Reach - 2-3 x daily - 7 x weekly - 2-3 sets - 10 reps      Access Code: [email or web address]  URL: [employer]/  Date: 10/05/2021  Prepared by: Edward Tate    Exercises  Half Kneeling Hip Flexor Stretch with Posterior Pelvic Tilt and Dowel - 2-3 x daily - 7 x weekly - 2-3 sets - 10 reps  Half Kneeling Hip Flexor Stretch with Sidebend - 2-3 x daily - 7 x weekly - 2-3 sets - 10 reps  Supine Bilateral Hamstring Sets - 2-3 x daily - 7 x weekly - 2-3 sets - 10 reps  Sidelying Hip Abduction with Resistance at Thighs - 2-3 x daily - 7 x weekly - 2-3 sets - 10 reps  Sidelying Feet Elevated Clamshells - 2-3 x daily - 7 x weekly - 2-3 sets - 10 reps  Forward Monster Walks - 2-3 x daily - 7 x weekly - 2-3 sets - 10 reps  Squat with Chest Press - 2-3 x daily - 7 x weekly - 2-3 sets - 10 reps  Standard Plank - 2-3 x daily - 7 x weekly - 2-3 sets - 10 reps    Access Code: [email or web address]  URL: [email or web address]  Date: 10/05/2021  Prepared by: Edward Tate    Exercises  Half Kneeling Hip Flexor Stretch with Posterior Pelvic Tilt and Dowel - 2-3 x daily - 7 x weekly - 2-3 sets - 10 reps  Half Kneeling Hip Flexor Stretch with Sidebend - 2-3 x daily - 7 x weekly - 2-3 sets - 10 reps  Supine Bilateral Hamstring Sets - 2-3 x daily - 7 x weekly - 2-3 sets - 10 reps  Sidelying Hip Abduction with Resistance at Thighs - 2-3 x daily - 7 x weekly - 2-3 sets - 10 reps  Sidelying Feet Elevated Clamshells - 2-3 x daily - 7 x weekly - 2-3 sets - 10 reps  Forward Monster Walks - 2-3 x daily - 7 x weekly - 2-3 sets - 10 reps  Squat with Chest Press - 2-3 x daily - 7 x weekly - 2-3 sets - 10 reps  Standard Plank - 2-3 x daily - 7 x weekly - 2-3 sets - 10 reps    Exercises/Interventions:     Therapeutic Ex Sets/reps Notes   HEP instruction as above          Quadruped rock neutral, IR x8 each     Bird dog hip shift airex  Bird dog hip ext L NV  NV    Hip hinge  dowel [id number] for posterior hip shift   SB flexion  SB hip ext   SB flexion HS iso 90 2x10  2x10  :15x5   Prone HSC 1 pillow    Prone hip ext 1 pillow x20    ecc LP 60#  2x10    Seated tennis ball AROM knee ext mod range Ed for HEP    CC retro walk + 5 squats 6 pl x6    Box squat elevated NV    Manual Intervention     CFM HS origin/scar tissue 6 min    Knee ext mobs     t/HS STW, gastroc, popliteus     IASTM sweeps medial and lateral HS     Ant hip mobs 4 min         NMR re-education     SLS airex NV    MRE ecc ER, IR 1 pillow x10 each    D.D squat :20x6                                      Therapeutic Exercise and NMR EXR  [x] (59954) Provided verbal/tactile cueing for activities related to strengthening, flexibility, endurance, ROM for improvements in LE, proximal hip, and core control with self care, mobility, lifting, ambulation.  [] (57513) Provided verbal/tactile cueing for activities related to improving balance, coordination, kinesthetic sense, posture, motor skill, proprioception  to assist with LE, proximal hip, and core control in self care, mobility, lifting, ambulation and eccentric single leg control.      NMR and Therapeutic Activities:    [x] (63360 or 39009) Provided verbal/tactile cueing for activities related to improving balance, coordination, kinesthetic sense, posture, motor skill, proprioception and motor activation to allow for proper function of core, proximal hip and LE with self care and ADLs  [] (29828) Gait Re-education- Provided training and instruction to the patient for proper LE, core and proximal hip recruitment and positioning and eccentric body weight control with ambulation re-education including up and down stairs     Home Exercise Program:    [x] (21454) Reviewed/Progressed HEP activities related to strengthening, flexibility, endurance, ROM of core, proximal hip and LE for functional self-care, mobility, lifting and ambulation/stair navigation   [] (35611)Reviewed/Progressed HEP activities related to improving balance, coordination, kinesthetic sense, posture, motor skill, proprioception of core, proximal hip and LE for self care, mobility, lifting, and ambulation/stair navigation      Manual Treatments:  PROM / STM / Oscillations-Mobs:  G-I, II, III, IV (PA's, Inf., Post.)  [x] (03021) Provided manual therapy to mobilize LE, proximal hip and/or LS spine soft tissue/joints for the purpose of modulating pain, promoting relaxation,  increasing ROM, reducing/eliminating soft tissue swelling/inflammation/restriction, improving soft tissue extensibility and allowing for proper ROM for normal function with self care, mobility, lifting and ambulation.      Modalities:        [] GR/ESU 15 min    [] GR 15 min  [] ESU     [] CP    [] MHP    [x] declined     Charges:  Timed Code Treatment Minutes: 55   Total Treatment Minutes: 55     [employer] time in/time out:   (and requires time in and out for each CPT code)    [x] EVAL (LOW) 93392 (typically 20 minutes face-to-face)  [] EVAL (MOD) 76263 (typically 30 minutes face-to-face)  [] EVAL (HIGH) [id number] (typically 45 minutes face-to-face)  [] RE-EVAL     [x] AA(27367) x2 [] IONTO  [x] NMR (74600) x  1   [] VASO  [x] Manual (14107) x 1    [] Other:  [] TA x      [] Mech Traction (63109)  [] ES(attended) (33810)      [] ES (un) (17719):     GOALS:     Long Term Goals: To be achieved in: 12 weeks  1. Disability index score of 15% or less for the LEFS  to assist with reaching prior level of function. [] Progressing: [] Met: [x] Not Met: [] Adjusted  2. Patient will demonstrate increased AROM to WNL to allow for proper joint functioning as indicated by patients Functional Deficits. [] Progressing: [] Met: [x] Not Met: [] Adjusted  3. Patient will demonstrate an increase in Strength to good proximal hip and core activation to allow for proper functional mobility as indicated by patients Functional Deficits. [] Progressing: [] Met: [x] Not Met: [] Adjusted  4. Patient will return to stairs and squatting functional activities without increased symptoms or restriction. [] Progressing: [] Met: [x] Not Met: [] Adjusted  5. Pt will transition to workout routine; GAP return to running. [] Progressing: [] Met: [x] Not Met: [] Adjusted     Progression Towards Functional goals:  [] Patient is progressing as expected towards functional goals listed. [] Progression is slowed due to complexities listed. [] Progression has been slowed due to co-morbidities. [x] Plan just implemented, too soon to assess goals progression  [] Other:     Overall Progression Towards Functional goals/ Treatment Progress Update:  [] Patient is progressing as expected towards functional goals listed. [] Progression is slowed due to complexities/Impairments listed. [] Progression has been slowed due to co-morbidities.   [x] Plan just implemented, too soon to assess goals progression <30days   [] Goals require adjustment due to lack of progress  [] Patient is not progressing as expected and requires additional follow up with physician  [] Other    Prognosis for POC: [x] Good [] Fair  [] Poor      Patient requires continued skilled intervention: [x] Yes  [] No    Treatment/Activity Tolerance:  [x] Patient able to complete treatment  [] Patient limited by fatigue  [] Patient limited by pain    [] Patient limited by other medical complications  [] Other:     Assessment: Tolerated HSC and hip ext well. Good body awareness with squats, ecc LP, and CC retro walk. Return to Play: (if applicable)   []  Stage 1: Intro to Strength   []  Stage 2: Return to Run and Strength   []  Stage 3: Return to Jump and Strength   []  Stage 4: Dynamic Strength and Agility   []  Stage 5: Sport Specific Training     []  Ready to Return to Play, Meets All Above Stages   []  Not Ready for Return to Sports   Comments:                               PLAN: See eval  [x] Continue per plan of care [] Alter current plan (see comments above)  [] Plan of care initiated [] Hold pending MD visit [] Discharge      Electronically signed by:  Charlee Sanchez PT    Note: If patient does not return for scheduled/ recommended follow up visits, this note will serve as a discharge from care along with most recent update on progress.

## 2021-10-14 ENCOUNTER — HOSPITAL ENCOUNTER (OUTPATIENT)
Dept: PHYSICAL THERAPY | Age: 51
Setting detail: THERAPIES SERIES
Discharge: HOME | End: 2021-10-14
Payer: COMMERCIAL

## 2021-10-14 PROCEDURE — 97110 THERAPEUTIC EXERCISES: CPT | Performed by: PHYSICAL THERAPIST

## 2021-10-14 PROCEDURE — 97140 MANUAL THERAPY 1/> REGIONS: CPT | Performed by: PHYSICAL THERAPIST

## 2021-10-14 PROCEDURE — 97112 NEUROMUSCULAR REEDUCATION: CPT | Performed by: PHYSICAL THERAPIST

## 2021-10-14 NOTE — FLOWSHEET NOTE
The [address] [facility], [address] [facility], [address], [place]      Physical Therapy Daily Treatment Note  Date:  10/14/2021    Patient Name:  Dorie Guevara    :  1970  MRN: [id number]  Restrictions/Precautions:    Physician Information:  Referring Practitioner: Rad Dang  Medical/Treatment Diagnosis Information:  · Diagnosis: V12.793P (ICD-10-CM) - Right hamstring injury, subsequent encounter s/p R HS proximal repair 2021   Treatment Diagnosis:  Right hip pain M25.551   [] Conservative / [] Surgical - DOS:    Insurance/Certification information:     Plan of care signed: [] YES  [] NO  Number of Comorbidities:  []0     [x]1-2    []3+  Date of Patient follow up with Physician:       Is this a Progress Report:     []  Yes  [x]  No        If Yes:  Date Range for reporting period:  Beginning 2021  Ending 2021    Progress report will be due (10 Rx or 30 days whichever is less):        Recertification will be due (POC Duration  / 90 days whichever is less): 2021        Visit # Insurance Allowable   11 30   Telehealth        Latex Allergy:  [x]NO      []YES  Preferred Language for Healthcare:   [x]English       []other:    SUBJECTIVE:  Working on good form with squatting to pick MB up from ground.   OBJECTIVE:    Initial Initial Current   VAS 3  0   LEFS 99%  40%   ROM LEFT RIGHT    HIP Flex  65 100   HIP Abd      HIP add      HIP Ext      HIP IR      HIP ER      Knee ext  -15 +4   Knee Flex  90 120   Ankle PF      Ankle DF      Ankle In      Ankle Ev      Strength  LEFT RIGHT    HIP Flexors  4 4+   HIP Abductors      HIP add      HIP Ext      Hip ER      Hip IR      Knee EXT (quad)  Fair activation in 15 degrees flexion Good-      Observation:    Test measurements:      RESTRICTIONS/PRECAUTIONS: see protocol media tab  Wk 5: 2021  Wk 6: 9/15/2021  Wk 10: 10/13/2021 **ankle weight PRE: 1# every week bridging SLR  Wk 12: mobility, lifting, ambulation and eccentric single leg control. NMR and Therapeutic Activities:    [x] (91434 or 39530) Provided verbal/tactile cueing for activities related to improving balance, coordination, kinesthetic sense, posture, motor skill, proprioception and motor activation to allow for proper function of core, proximal hip and LE with self care and ADLs  [] (02904) Gait Re-education- Provided training and instruction to the patient for proper LE, core and proximal hip recruitment and positioning and eccentric body weight control with ambulation re-education including up and down stairs     Home Exercise Program:    [x] (73912) Reviewed/Progressed HEP activities related to strengthening, flexibility, endurance, ROM of core, proximal hip and LE for functional self-care, mobility, lifting and ambulation/stair navigation   [] (99386)Reviewed/Progressed HEP activities related to improving balance, coordination, kinesthetic sense, posture, motor skill, proprioception of core, proximal hip and LE for self care, mobility, lifting, and ambulation/stair navigation      Manual Treatments:  PROM / STM / Oscillations-Mobs:  G-I, II, III, IV (PA's, Inf., Post.)  [x] (83073) Provided manual therapy to mobilize LE, proximal hip and/or LS spine soft tissue/joints for the purpose of modulating pain, promoting relaxation,  increasing ROM, reducing/eliminating soft tissue swelling/inflammation/restriction, improving soft tissue extensibility and allowing for proper ROM for normal function with self care, mobility, lifting and ambulation.      Modalities:        [] GR/ESU 15 min    [] GR 15 min  [] ESU     [] CP    [] MHP    [x] declined     Charges:  Timed Code Treatment Minutes: 44   Total Treatment Minutes: 44     [facility] time in/time out:   (and requires time in and out for each CPT code)    [x] EVAL (LOW) 20081 (typically 20 minutes face-to-face)  [] EVAL (MOD) 29334 (typically 30 minutes face-to-face)  [] EVAL (HIGH) 13006 (typically 45 minutes face-to-face)  [] RE-EVAL     [x] CC(22923) x1     [] IONTO  [x] NMR (90284) x  1   [] VASO  [x] Manual (28250) x 1    [] Other:  [] TA x      [] Mech Traction (10431)  [] ES(attended) (27994)      [] ES (un) (06301):     GOALS:     Long Term Goals: To be achieved in: 12 weeks  1. Disability index score of 15% or less for the LEFS  to assist with reaching prior level of function. [] Progressing: [] Met: [x] Not Met: [] Adjusted  2. Patient will demonstrate increased AROM to WNL to allow for proper joint functioning as indicated by patients Functional Deficits. [] Progressing: [] Met: [x] Not Met: [] Adjusted  3. Patient will demonstrate an increase in Strength to good proximal hip and core activation to allow for proper functional mobility as indicated by patients Functional Deficits. [] Progressing: [] Met: [x] Not Met: [] Adjusted  4. Patient will return to stairs and squatting functional activities without increased symptoms or restriction. [] Progressing: [] Met: [x] Not Met: [] Adjusted  5. Pt will transition to workout routine; GAP return to running. [] Progressing: [] Met: [x] Not Met: [] Adjusted     Progression Towards Functional goals:  [] Patient is progressing as expected towards functional goals listed. [] Progression is slowed due to complexities listed. [] Progression has been slowed due to co-morbidities. [x] Plan just implemented, too soon to assess goals progression  [] Other:     Overall Progression Towards Functional goals/ Treatment Progress Update:  [] Patient is progressing as expected towards functional goals listed. [] Progression is slowed due to complexities/Impairments listed. [] Progression has been slowed due to co-morbidities.   [x] Plan just implemented, too soon to assess goals progression <30days   [] Goals require adjustment due to lack of progress  [] Patient is not progressing as expected and requires additional follow up with physician  [] Other    Prognosis for POC: [x] Good [] Fair  [] Poor      Patient requires continued skilled intervention: [x] Yes  [] No    Treatment/Activity Tolerance:  [x] Patient able to complete treatment  [] Patient limited by fatigue  [] Patient limited by pain    [] Patient limited by other medical complications  [] Other:     Assessment: Challenged with LWB quadruped, especially with addition of mod R hip ext. Fatigue with balance TE at end of session. Return to Play: (if applicable)   []  Stage 1: Intro to Strength   []  Stage 2: Return to Run and Strength   []  Stage 3: Return to Jump and Strength   []  Stage 4: Dynamic Strength and Agility   []  Stage 5: Sport Specific Training     []  Ready to Return to Play, Meets All Above Stages   []  Not Ready for Return to Sports   Comments:                               PLAN: See eval  [x] Continue per plan of care [] Alter current plan (see comments above)  [] Plan of care initiated [] Hold pending MD visit [] Discharge      Electronically signed by:  Margret Piper PT    Note: If patient does not return for scheduled/ recommended follow up visits, this note will serve as a discharge from care along with most recent update on progress.

## 2021-10-18 ENCOUNTER — HOSPITAL ENCOUNTER (OUTPATIENT)
Dept: PHYSICAL THERAPY | Age: 51
Setting detail: THERAPIES SERIES
Discharge: HOME | End: 2021-10-18
Payer: COMMERCIAL

## 2021-10-18 PROCEDURE — 97112 NEUROMUSCULAR REEDUCATION: CPT | Performed by: PHYSICAL THERAPIST

## 2021-10-18 PROCEDURE — 97140 MANUAL THERAPY 1/> REGIONS: CPT | Performed by: PHYSICAL THERAPIST

## 2021-10-18 PROCEDURE — 97110 THERAPEUTIC EXERCISES: CPT | Performed by: PHYSICAL THERAPIST

## 2021-10-18 NOTE — FLOWSHEET NOTE
The [address] [facility], [address] [facility], [address], [address]      Physical Therapy Daily Treatment Note  Date:  10/18/2021    Patient Name:  Janalee Crigler    :  1970  MRN: [id number]  Restrictions/Precautions:    Physician Information:  Referring Practitioner: Skyler Wayne  Medical/Treatment Diagnosis Information:  · Diagnosis: Z61.469U (ICD-10-CM) - Right hamstring injury, subsequent encounter s/p R HS proximal repair 2021   Treatment Diagnosis:  Right hip pain M25.551   [] Conservative / [] Surgical - DOS:    Insurance/Certification information:     Plan of care signed: [] YES  [] NO  Number of Comorbidities:  []0     [x]1-2    []3+  Date of Patient follow up with Physician:       Is this a Progress Report:     []  Yes  [x]  No        If Yes:  Date Range for reporting period:  Beginning 2021  Ending 2021    Progress report will be due (10 Rx or 30 days whichever is less):        Recertification will be due (POC Duration  / 90 days whichever is less): 2021        Visit # Insurance Allowable   12 30   Telehealth        Latex Allergy:  [x]NO      []YES  Preferred Language for Healthcare:   [x]English       []other:    SUBJECTIVE:  Working on good form with squatting to pick MB up from ground.   OBJECTIVE:    Initial Initial Current   VAS 3  0   LEFS 99%  40%   ROM LEFT RIGHT    HIP Flex  65 100   HIP Abd      HIP add      HIP Ext      HIP IR      HIP ER      Knee ext  -15 +4   Knee Flex  90 120   Ankle PF      Ankle DF      Ankle In      Ankle Ev      Strength  LEFT RIGHT    HIP Flexors  4 4+   HIP Abductors      HIP add      HIP Ext      Hip ER      Hip IR      Knee EXT (quad)  Fair activation in 15 degrees flexion Good-      Observation:    Test measurements:      RESTRICTIONS/PRECAUTIONS: see protocol media tab  Wk 5: 2021  Wk 6: 9/15/2021  Wk 10: 10/13/2021 **ankle weight PRE: 1# every week bridging SLR  Wk 12: 10/27/2021     Wk 0-4 2 weeks 50% WB  Knee ext block 15  Hip flexion <90    Wk 5-6 WBAT Hip flexion 90 No active HS, no active hip ext   Wk 6-12 No brace  Wean off crutches Active hip and knee flexion Bike  Hip ext, HSC anti-gravity   Mo 3-6  Full ROM  Gentle HS (S)    Mo 6-9      Access Code: [email or web address]  URL: [email or web address]  Date: 10/05/2021  Prepared by: Ev Quiroz    Exercises  Standing Hip Abduction Kicks - 2-3 x daily - 7 x weekly - 2-3 sets - 10 reps  Standing Hip Adduction with Anchored Resistance - 2-3 x daily - 7 x weekly - 2-3 sets - 10 reps  Standing Hip Flexion with Resistance Loop - 2-3 x daily - 7 x weekly - 2-3 sets - 10 reps  Staggered Stance Squat - 2-3 x daily - 7 x weekly - 2-3 sets - 10 reps  Lunge Matrix - 2-3 x daily - 7 x weekly - 2-3 sets - 10 reps  Eccentric Heel Lowering on Step - 2-3 x daily - 7 x weekly - 2-3 sets - 10 reps  Single Leg Balance with Clock Reach - 2-3 x daily - 7 x weekly - 2-3 sets - 10 reps      Access Code: [email or web address]  URL: [email or web address]  Date: 10/05/2021  Prepared by: Ev Quiroz    Exercises  Half Kneeling Hip Flexor Stretch with Posterior Pelvic Tilt and Dowel - 2-3 x daily - 7 x weekly - 2-3 sets - 10 reps  Half Kneeling Hip Flexor Stretch with Sidebend - 2-3 x daily - 7 x weekly - 2-3 sets - 10 reps  Supine Bilateral Hamstring Sets - 2-3 x daily - 7 x weekly - 2-3 sets - 10 reps  Sidelying Hip Abduction with Resistance at Thighs - 2-3 x daily - 7 x weekly - 2-3 sets - 10 reps  Sidelying Feet Elevated Clamshells - 2-3 x daily - 7 x weekly - 2-3 sets - 10 reps  Forward Monster Walks - 2-3 x daily - 7 x weekly - 2-3 sets - 10 reps  Squat with Chest Press - 2-3 x daily - 7 x weekly - 2-3 sets - 10 reps  Standard Plank - 2-3 x daily - 7 x weekly - 2-3 sets - 10 reps    Access Code: [email or web address]  URL: [email or web address]  Date: 10/05/2021  Prepared by: Ev Quiroz    Exercises  Half Kneeling Hip Flexor Stretch with Posterior Pelvic Tilt and Dowel - 2-3 x daily - 7 x weekly - 2-3 sets - 10 reps  Half Kneeling Hip Flexor Stretch with Sidebend - 2-3 x daily - 7 x weekly - 2-3 sets - 10 reps  Supine Bilateral Hamstring Sets - 2-3 x daily - 7 x weekly - 2-3 sets - 10 reps  Sidelying Hip Abduction with Resistance at Thighs - 2-3 x daily - 7 x weekly - 2-3 sets - 10 reps  Sidelying Feet Elevated Clamshells - 2-3 x daily - 7 x weekly - 2-3 sets - 10 reps  Forward Monster Walks - 2-3 x daily - 7 x weekly - 2-3 sets - 10 reps  Squat with Chest Press - 2-3 x daily - 7 x weekly - 2-3 sets - 10 reps  Standard Plank - 2-3 x daily - 7 x weekly - 2-3 sets - 10 reps    Exercises/Interventions:     Therapeutic Ex Sets/reps Notes   HEP instruction as above          Quadruped rock neutral, IR NV     Bird dog hip shift airex  Bird dog hip ext L NV   Mod R hip ext   Hip hinge  dowel Cues for posterior hip shift   SB  Bridge HSC on floor  SB hip ext   SB flexion HS iso 90 2x10   Prone HSC 1 pillow 3x10 2#IASTM HS facil sweeps   Prone hip ext 1 pillow  Prone EOB hip ext x20 2#  2x10   Mirror visual cue   ecc LP 60#  NV    Seated tennis ball AROM knee ext mod range Ed for HEP    CC retro walk + 5 squats     6 in box ant reach 3D x8 each    Hip hike 2x10 R/L 2 in   Manual Intervention     CFM HS origin/scar tissue 6 min    Knee ext mobs     t/HS STW, gastroc, popliteus     IASTM sweeps medial and lateral HS 6 min    Ant hip mobs NV         NMR re-education     SLS airex :15x3  bosu LLE Hip flexion   MRE ecc ER, IR 1 pillow x10 each    D.D squat     Box squat 24 in box x10                                 Therapeutic Exercise and NMR EXR  [x] (27451) Provided verbal/tactile cueing for activities related to strengthening, flexibility, endurance, ROM for improvements in LE, proximal hip, and core control with self care, mobility, lifting, ambulation.  [] (57749) Provided verbal/tactile cueing for activities related to improving balance, coordination, kinesthetic sense, posture, motor skill, proprioception  to assist with LE, proximal hip, and core control in self care, mobility, lifting, ambulation and eccentric single leg control. NMR and Therapeutic Activities:    [x] (14472 or 96680) Provided verbal/tactile cueing for activities related to improving balance, coordination, kinesthetic sense, posture, motor skill, proprioception and motor activation to allow for proper function of core, proximal hip and LE with self care and ADLs  [] (40294) Gait Re-education- Provided training and instruction to the patient for proper LE, core and proximal hip recruitment and positioning and eccentric body weight control with ambulation re-education including up and down stairs     Home Exercise Program:    [x] (15231) Reviewed/Progressed HEP activities related to strengthening, flexibility, endurance, ROM of core, proximal hip and LE for functional self-care, mobility, lifting and ambulation/stair navigation   [] (96992)Reviewed/Progressed HEP activities related to improving balance, coordination, kinesthetic sense, posture, motor skill, proprioception of core, proximal hip and LE for self care, mobility, lifting, and ambulation/stair navigation      Manual Treatments:  PROM / STM / Oscillations-Mobs:  G-I, II, III, IV (PA's, Inf., Post.)  [x] (25041) Provided manual therapy to mobilize LE, proximal hip and/or LS spine soft tissue/joints for the purpose of modulating pain, promoting relaxation,  increasing ROM, reducing/eliminating soft tissue swelling/inflammation/restriction, improving soft tissue extensibility and allowing for proper ROM for normal function with self care, mobility, lifting and ambulation.      Modalities:        [] GR/ESU 15 min    [] GR 15 min  [] ESU     [] CP    [] MHP    [x] declined     Charges:  Timed Code Treatment Minutes: 44   Total Treatment Minutes: 44     [facility] time in/time out:   (and requires time in and out for each CPT code)    [x] TREVOR (LOW) 72164 (typically 20 minutes face-to-face)  [] EVAL (MOD) 24363 (typically 30 minutes face-to-face)  [] EVAL (HIGH) 65600 (typically 45 minutes face-to-face)  [] RE-EVAL     [x] OE(56916) x1     [] IONTO  [x] NMR (53580) x  1   [] VASO  [x] Manual (39714) x 1    [] Other:  [] TA x      [] Mech Traction (36817)  [] ES(attended) (54645)      [] ES (un) (32327):     GOALS:     Long Term Goals: To be achieved in: 12 weeks  1. Disability index score of 15% or less for the LEFS  to assist with reaching prior level of function. [] Progressing: [] Met: [x] Not Met: [] Adjusted  2. Patient will demonstrate increased AROM to WNL to allow for proper joint functioning as indicated by patients Functional Deficits. [] Progressing: [] Met: [x] Not Met: [] Adjusted  3. Patient will demonstrate an increase in Strength to good proximal hip and core activation to allow for proper functional mobility as indicated by patients Functional Deficits. [] Progressing: [] Met: [x] Not Met: [] Adjusted  4. Patient will return to stairs and squatting functional activities without increased symptoms or restriction. [] Progressing: [] Met: [x] Not Met: [] Adjusted  5. Pt will transition to workout routine; GAP return to running. [] Progressing: [] Met: [x] Not Met: [] Adjusted     Progression Towards Functional goals:  [] Patient is progressing as expected towards functional goals listed. [] Progression is slowed due to complexities listed. [] Progression has been slowed due to co-morbidities. [x] Plan just implemented, too soon to assess goals progression  [] Other:     Overall Progression Towards Functional goals/ Treatment Progress Update:  [] Patient is progressing as expected towards functional goals listed. [] Progression is slowed due to complexities/Impairments listed. [] Progression has been slowed due to co-morbidities.   [x] Plan just implemented, too soon to assess goals progression <30days   [] Goals require adjustment due to lack of progress  [] Patient is not progressing as expected and requires additional follow up with physician  [] Other    Prognosis for POC: [x] Good [] Fair  [] Poor      Patient requires continued skilled intervention: [x] Yes  [] No    Treatment/Activity Tolerance:  [x] Patient able to complete treatment  [] Patient limited by fatigue  [] Patient limited by pain    [] Patient limited by other medical complications  [] Other:     Assessment: Mirror for visual cues to limit lumbopelvic compensation during prone EOB hip ext. Return to Play: (if applicable)   []  Stage 1: Intro to Strength   []  Stage 2: Return to Run and Strength   []  Stage 3: Return to Jump and Strength   []  Stage 4: Dynamic Strength and Agility   []  Stage 5: Sport Specific Training     []  Ready to Return to Play, Meets All Above Stages   []  Not Ready for Return to Sports   Comments:                               PLAN: See eval  [x] Continue per plan of care [] Alter current plan (see comments above)  [] Plan of care initiated [] Hold pending MD visit [] Discharge      Electronically signed by:  Jose Daniel Richards PT    Note: If patient does not return for scheduled/ recommended follow up visits, this note will serve as a discharge from care along with most recent update on progress.

## 2021-10-25 ENCOUNTER — HOSPITAL ENCOUNTER (OUTPATIENT)
Dept: PHYSICAL THERAPY | Age: 51
Setting detail: THERAPIES SERIES
Discharge: HOME | End: 2021-10-25
Payer: COMMERCIAL

## 2021-10-25 PROCEDURE — 97140 MANUAL THERAPY 1/> REGIONS: CPT | Performed by: PHYSICAL THERAPIST

## 2021-10-25 PROCEDURE — 97112 NEUROMUSCULAR REEDUCATION: CPT | Performed by: PHYSICAL THERAPIST

## 2021-10-25 PROCEDURE — 97110 THERAPEUTIC EXERCISES: CPT | Performed by: PHYSICAL THERAPIST

## 2021-10-25 NOTE — FLOWSHEET NOTE
[address] [facility] [address]      Physical Therapy Daily Treatment Note  Date:  10/25/2021    Patient Name:  John German    :  1970  MRN: [id number]  Restrictions/Precautions:    Physician Information:  Referring Practitioner: Gio Headley  Medical/Treatment Diagnosis Information:  · Diagnosis: P72.868S (ICD-10-CM) - Right hamstring injury, subsequent encounter s/p R HS proximal repair 2021   Treatment Diagnosis:  Right hip pain M25.551   [] Conservative / [] Surgical - DOS:    Insurance/Certification information:     Plan of care signed: [] YES  [] NO  Number of Comorbidities:  []0     [x]1-2    []3+  Date of Patient follow up with Physician:       Is this a Progress Report:     []  Yes  [x]  No        If Yes:  Date Range for reporting period:  Beginning 2021  Ending 2021    Progress report will be due (10 Rx or 30 days whichever is less):        Recertification will be due (POC Duration  / 90 days whichever is less): 2021        Visit # Insurance Allowable   12  PN NV 30   Telehealth        Latex Allergy:  [x]NO      []YES  Preferred Language for Healthcare:   [x]English       []other:    SUBJECTIVE:  Only real soreness is with sitting.   OBJECTIVE:    Initial Initial Current   VAS 3  0   LEFS 99%  40%   ROM LEFT RIGHT    HIP Flex  65 100   HIP Abd      HIP add      HIP Ext      HIP IR      HIP ER      Knee ext  -15 +4   Knee Flex  90 120   Ankle PF      Ankle DF      Ankle In      Ankle Ev      Strength  LEFT RIGHT    HIP Flexors  4 4+   HIP Abductors      HIP add      HIP Ext      Hip ER      Hip IR      Knee EXT (quad)  Fair activation in 15 degrees flexion Good-      Observation:    Test measurements:      RESTRICTIONS/PRECAUTIONS: see protocol media tab  Wk 5: 2021  Wk 6: 9/15/2021  Wk 10: 10/13/2021 **ankle weight PRE: 1# every week bridging SLR  Wk 12: 10/27/2021     Wk 0-4 2 weeks 50% WB  Knee ext block 15  Hip flexion <90    Wk 5-6 WBAT Hip flexion 90 No active HS, no active hip ext   Wk 6-12 No brace  Wean off crutches Active hip and knee flexion Bike  Hip ext, HSC anti-gravity   Mo 3-6  Full ROM  Gentle HS (S)    Mo 6-9      Access Code: [email or web address]  URL: [employer]/  Date: 10/05/2021  Prepared by: Connie Areola    Exercises  Standing Hip Abduction Kicks - 2-3 x daily - 7 x weekly - 2-3 sets - 10 reps  Standing Hip Adduction with Anchored Resistance - 2-3 x daily - 7 x weekly - 2-3 sets - 10 reps  Standing Hip Flexion with Resistance Loop - 2-3 x daily - 7 x weekly - 2-3 sets - 10 reps  Staggered Stance Squat - 2-3 x daily - 7 x weekly - 2-3 sets - 10 reps  Lunge Matrix - 2-3 x daily - 7 x weekly - 2-3 sets - 10 reps  Eccentric Heel Lowering on Step - 2-3 x daily - 7 x weekly - 2-3 sets - 10 reps  Single Leg Balance with Clock Reach - 2-3 x daily - 7 x weekly - 2-3 sets - 10 reps      Access Code: [email or web address]  URL: [employer]/  Date: 10/05/2021  Prepared by: Connie Areola    Exercises  Half Kneeling Hip Flexor Stretch with Posterior Pelvic Tilt and Dowel - 2-3 x daily - 7 x weekly - 2-3 sets - 10 reps  Half Kneeling Hip Flexor Stretch with Sidebend - 2-3 x daily - 7 x weekly - 2-3 sets - 10 reps  Supine Bilateral Hamstring Sets - 2-3 x daily - 7 x weekly - 2-3 sets - 10 reps  Sidelying Hip Abduction with Resistance at Thighs - 2-3 x daily - 7 x weekly - 2-3 sets - 10 reps  Sidelying Feet Elevated Clamshells - 2-3 x daily - 7 x weekly - 2-3 sets - 10 reps  Forward Monster Walks - 2-3 x daily - 7 x weekly - 2-3 sets - 10 reps  Squat with Chest Press - 2-3 x daily - 7 x weekly - 2-3 sets - 10 reps  Standard Plank - 2-3 x daily - 7 x weekly - 2-3 sets - 10 reps    Access Code: [email or web address]  URL: [email or web address]  Date: 10/05/2021  Prepared by: Connie Vasquez    Exercises  Half Kneeling Hip Flexor Stretch with Posterior Pelvic Tilt and Dowel - 2-3 x assist with LE, proximal hip, and core control in self care, mobility, lifting, ambulation and eccentric single leg control. NMR and Therapeutic Activities:    [x] (94735 or 05270) Provided verbal/tactile cueing for activities related to improving balance, coordination, kinesthetic sense, posture, motor skill, proprioception and motor activation to allow for proper function of core, proximal hip and LE with self care and ADLs  [] (24892) Gait Re-education- Provided training and instruction to the patient for proper LE, core and proximal hip recruitment and positioning and eccentric body weight control with ambulation re-education including up and down stairs     Home Exercise Program:    [x] (17290) Reviewed/Progressed HEP activities related to strengthening, flexibility, endurance, ROM of core, proximal hip and LE for functional self-care, mobility, lifting and ambulation/stair navigation   [] (23784)Reviewed/Progressed HEP activities related to improving balance, coordination, kinesthetic sense, posture, motor skill, proprioception of core, proximal hip and LE for self care, mobility, lifting, and ambulation/stair navigation      Manual Treatments:  PROM / STM / Oscillations-Mobs:  G-I, II, III, IV (PA's, Inf., Post.)  [x] (91656) Provided manual therapy to mobilize LE, proximal hip and/or LS spine soft tissue/joints for the purpose of modulating pain, promoting relaxation,  increasing ROM, reducing/eliminating soft tissue swelling/inflammation/restriction, improving soft tissue extensibility and allowing for proper ROM for normal function with self care, mobility, lifting and ambulation.      Modalities:        [] GR/ESU 15 min    [] GR 15 min  [] ESU     [] CP    [] MHP    [x] declined     Charges:  Timed Code Treatment Minutes: 44   Total Treatment Minutes: 44     [facility] time in/time out:   (and requires time in and out for each CPT code)    [x] EVAL (LOW) 94159 (typically 20 minutes face-to-face)  [] EVAL (MOD) 16569 (typically 30 minutes face-to-face)  [] EVAL (HIGH) 62361 (typically 45 minutes face-to-face)  [] RE-EVAL     [x] IS(48078) x1     [] IONTO  [x] NMR (89713) x  1   [] VASO  [x] Manual (14743) x 1    [] Other:  [] TA x      [] Mech Traction (68306)  [] ES(attended) (95726)      [] ES (un) (32306):     GOALS:     Long Term Goals: To be achieved in: 12 weeks  1. Disability index score of 15% or less for the LEFS  to assist with reaching prior level of function. [] Progressing: [] Met: [x] Not Met: [] Adjusted  2. Patient will demonstrate increased AROM to WNL to allow for proper joint functioning as indicated by patients Functional Deficits. [] Progressing: [] Met: [x] Not Met: [] Adjusted  3. Patient will demonstrate an increase in Strength to good proximal hip and core activation to allow for proper functional mobility as indicated by patients Functional Deficits. [] Progressing: [] Met: [x] Not Met: [] Adjusted  4. Patient will return to stairs and squatting functional activities without increased symptoms or restriction. [] Progressing: [] Met: [x] Not Met: [] Adjusted  5. Pt will transition to workout routine; GAP return to running. [] Progressing: [] Met: [x] Not Met: [] Adjusted     Progression Towards Functional goals:  [] Patient is progressing as expected towards functional goals listed. [] Progression is slowed due to complexities listed. [] Progression has been slowed due to co-morbidities. [x] Plan just implemented, too soon to assess goals progression  [] Other:     Overall Progression Towards Functional goals/ Treatment Progress Update:  [] Patient is progressing as expected towards functional goals listed. [] Progression is slowed due to complexities/Impairments listed. [] Progression has been slowed due to co-morbidities.   [x] Plan just implemented, too soon to assess goals progression <30days   [] Goals require adjustment due to lack of progress  [] Patient is not progressing as expected and requires additional follow up with physician  [] Other    Prognosis for POC: [x] Good [] Fair  [] Poor      Patient requires continued skilled intervention: [x] Yes  [] No    Treatment/Activity Tolerance:  [x] Patient able to complete treatment  [] Patient limited by fatigue  [] Patient limited by pain    [] Patient limited by other medical complications  [] Other:     Assessment: Progressing in open chain activation. Compensation of anterior chain seen in WB TE with posterior step work and posterior glider reach. Continue to progress as able. Return to Play: (if applicable)   []  Stage 1: Intro to Strength   []  Stage 2: Return to Run and Strength   []  Stage 3: Return to Jump and Strength   []  Stage 4: Dynamic Strength and Agility   []  Stage 5: Sport Specific Training     []  Ready to Return to Play, Meets All Above Stages   []  Not Ready for Return to Sports   Comments:                               PLAN: See eval  [x] Continue per plan of care [] Alter current plan (see comments above)  [] Plan of care initiated [] Hold pending MD visit [] Discharge      Electronically signed by:  Dalia Russell, PT    Note: If patient does not return for scheduled/ recommended follow up visits, this note will serve as a discharge from care along with most recent update on progress.

## 2021-11-01 ENCOUNTER — HOSPITAL ENCOUNTER (OUTPATIENT)
Dept: PHYSICAL THERAPY | Age: 51
Setting detail: THERAPIES SERIES
Discharge: HOME | End: 2021-11-01
Payer: COMMERCIAL

## 2021-11-01 PROCEDURE — 97140 MANUAL THERAPY 1/> REGIONS: CPT | Performed by: PHYSICAL THERAPIST

## 2021-11-01 PROCEDURE — 97112 NEUROMUSCULAR REEDUCATION: CPT | Performed by: PHYSICAL THERAPIST

## 2021-11-01 PROCEDURE — 97110 THERAPEUTIC EXERCISES: CPT | Performed by: PHYSICAL THERAPIST

## 2021-11-01 NOTE — FLOWSHEET NOTE
[address] [facility] [address]      Physical Therapy Daily Treatment Note  Date:  2021    Patient Name:  Julian Wolf    :  1970  MRN: [id number]  Restrictions/Precautions:    Physician Information:  Referring Practitioner: Mae Patel  Medical/Treatment Diagnosis Information:  · Diagnosis: T10.775S (ICD-10-CM) - Right hamstring injury, subsequent encounter s/p R HS proximal repair 2021   Treatment Diagnosis:  Right hip pain M25.551   [] Conservative / [] Surgical - DOS:    Insurance/Certification information:     Plan of care signed: [] YES  [] NO  Number of Comorbidities:  []0     [x]1-2    []3+  Date of Patient follow up with Physician:       Is this a Progress Report:     [x]  Yes  []  No        If Yes:  Date Range for reporting period:  Beginning 2021  Ending 2021    Progress report will be due (10 Rx or 30 days whichever is less):        Recertification will be due (POC Duration  / 90 days whichever is less): 2021        Visit # Insurance Allowable   13 30   Telehealth        Latex Allergy:  [x]NO      []YES  Preferred Language for Healthcare:   [x]English       []other:    SUBJECTIVE:  Only real soreness is with sitting. Increased ability to put on shoes and socks.   OBJECTIVE:    Initial Initial Current   VAS 3  0   LEFS 99%  40%   ROM LEFT RIGHT    HIP Flex  65 99 L, 107 R   HIP Abd      HIP add      HIP Ext      HIP IR   20   HIP ER      Knee ext  -15 +4   Knee Flex  90 120   Ankle PF      Ankle DF      90-90 HS   38 L 44 R   ASLR   64 L, 72 R   Strength  LEFT RIGHT    HIP Flexors  4 4+   HIP Abductors   R 4-   HIP add   3+ B   HIP Ext   4   Hip ER      Hip IR      Knee EXT (quad)  Fair activation in 15 degrees flexion 5      Observation:    Test measurements:      RESTRICTIONS/PRECAUTIONS: see protocol media tab  Wk 5: 2021  Wk 6: 9/15/2021  Wk 10: 10/13/2021 **ankle weight PRE: 1# every week bridging SLR  Wk 12: 10/27/2021     Wk 0-4 2 weeks 50% WB  Knee ext block 15  Hip flexion <90    Wk 5-6 WBAT Hip flexion 90 No active HS, no active hip ext   Wk 6-12 No brace  Wean off crutches Active hip and knee flexion Bike  Hip ext, HSC anti-gravity   Mo 3-6  Full ROM  Gentle HS (S)    Mo 6-9      Access Code: [email or web address]  URL: [employer]/  Date: 10/05/2021  Prepared by: Ramón Scott    Exercises  Standing Hip Abduction Kicks - 2-3 x daily - 7 x weekly - 2-3 sets - 10 reps  Standing Hip Adduction with Anchored Resistance - 2-3 x daily - 7 x weekly - 2-3 sets - 10 reps  Standing Hip Flexion with Resistance Loop - 2-3 x daily - 7 x weekly - 2-3 sets - 10 reps  Staggered Stance Squat - 2-3 x daily - 7 x weekly - 2-3 sets - 10 reps  Lunge Matrix - 2-3 x daily - 7 x weekly - 2-3 sets - 10 reps  Eccentric Heel Lowering on Step - 2-3 x daily - 7 x weekly - 2-3 sets - 10 reps  Single Leg Balance with Clock Reach - 2-3 x daily - 7 x weekly - 2-3 sets - 10 reps      Access Code: [email or web address]  URL: [employer]/  Date: 10/05/2021  Prepared by: Ramnó Scott    Exercises  Half Kneeling Hip Flexor Stretch with Posterior Pelvic Tilt and Dowel - 2-3 x daily - 7 x weekly - 2-3 sets - 10 reps  Half Kneeling Hip Flexor Stretch with Sidebend - 2-3 x daily - 7 x weekly - 2-3 sets - 10 reps  Supine Bilateral Hamstring Sets - 2-3 x daily - 7 x weekly - 2-3 sets - 10 reps  Sidelying Hip Abduction with Resistance at Thighs - 2-3 x daily - 7 x weekly - 2-3 sets - 10 reps  Sidelying Feet Elevated Clamshells - 2-3 x daily - 7 x weekly - 2-3 sets - 10 reps  Forward Monster Walks - 2-3 x daily - 7 x weekly - 2-3 sets - 10 reps  Squat with Chest Press - 2-3 x daily - 7 x weekly - 2-3 sets - 10 reps  Standard Plank - 2-3 x daily - 7 x weekly - 2-3 sets - 10 reps    Access Code: [email or web address]  URL: [email or web address]  Date: 10/05/2021  Prepared by: Ramón Phillips  Half Kneeling Hip Flexor Stretch with Posterior Pelvic Tilt and Dowel - 2-3 x daily - 7 x weekly - 2-3 sets - 10 reps  Half Kneeling Hip Flexor Stretch with Sidebend - 2-3 x daily - 7 x weekly - 2-3 sets - 10 reps  Supine Bilateral Hamstring Sets - 2-3 x daily - 7 x weekly - 2-3 sets - 10 reps  Sidelying Hip Abduction with Resistance at Thighs - 2-3 x daily - 7 x weekly - 2-3 sets - 10 reps  Sidelying Feet Elevated Clamshells - 2-3 x daily - 7 x weekly - 2-3 sets - 10 reps  Forward Monster Walks - 2-3 x daily - 7 x weekly - 2-3 sets - 10 reps  Squat with Chest Press - 2-3 x daily - 7 x weekly - 2-3 sets - 10 reps  Standard Plank - 2-3 x daily - 7 x weekly - 2-3 sets - 10 reps    Exercises/Interventions:     Therapeutic Ex Sets/reps Notes   HEP instruction as above     [address] Up to 60# 1x10 at gym    Glider post, PL x8 R/L    Quadruped rock neutral, IR NV     Bird dog hip shift airex  Bird dog hip ext L NV   Mod R hip ext   Hip hinge  dowel Cues for posterior hip shift   SB  Bridge HSC on floor  SB hip ext   SB flexion HS iso 90 NV   Prone HSC 1 pillow Using 4# at home   Prone hip ext 1 pillow  Prone EOB hip ext Using 4# at home     Mirror visual cue   ecc LP 60#  NV    3- way step 4 in box 2x10 each    CC retro walk + 5 squats     6 in box ant reach 3D x8 each    Hip hike 2x10 R/L 2 in   Manual Intervention     CFM HS origin/scar tissue     Knee ext mobs     t/HS STW, gastroc, popliteus     IASTM sweeps medial and lateral HS     MRE ecc ER, IR, HSC ecc 2x10 each         NMR re-education     SLS airex Hip flexion   MRE ecc ER, IR 1 pillow    D.D squat Chop NV    Box squat 24 in box                                  Therapeutic Exercise and NMR EXR  [x] (89729) Provided verbal/tactile cueing for activities related to strengthening, flexibility, endurance, ROM for improvements in LE, proximal hip, and core control with self care, mobility, lifting, ambulation.  [] (53044) Provided verbal/tactile cueing for activities related to improving balance, coordination, kinesthetic sense, posture, motor skill, proprioception  to assist with LE, proximal hip, and core control in self care, mobility, lifting, ambulation and eccentric single leg control. NMR and Therapeutic Activities:    [x] (06255 or 12712) Provided verbal/tactile cueing for activities related to improving balance, coordination, kinesthetic sense, posture, motor skill, proprioception and motor activation to allow for proper function of core, proximal hip and LE with self care and ADLs  [] (72667) Gait Re-education- Provided training and instruction to the patient for proper LE, core and proximal hip recruitment and positioning and eccentric body weight control with ambulation re-education including up and down stairs     Home Exercise Program:    [x] (96174) Reviewed/Progressed HEP activities related to strengthening, flexibility, endurance, ROM of core, proximal hip and LE for functional self-care, mobility, lifting and ambulation/stair navigation   [] (55163)Reviewed/Progressed HEP activities related to improving balance, coordination, kinesthetic sense, posture, motor skill, proprioception of core, proximal hip and LE for self care, mobility, lifting, and ambulation/stair navigation      Manual Treatments:  PROM / STM / Oscillations-Mobs:  G-I, II, III, IV (PA's, Inf., Post.)  [x] (79796) Provided manual therapy to mobilize LE, proximal hip and/or LS spine soft tissue/joints for the purpose of modulating pain, promoting relaxation,  increasing ROM, reducing/eliminating soft tissue swelling/inflammation/restriction, improving soft tissue extensibility and allowing for proper ROM for normal function with self care, mobility, lifting and ambulation.      Modalities:        [] GR/ESU 15 min    [] GR 15 min  [] ESU     [] CP    [] MHP    [x] declined     Charges:  Timed Code Treatment Minutes: 44   Total Treatment Minutes: 84 7526 [facility] time in/time out:   (and requires time in and out for each CPT code)    [x] EVAL (LOW) 98350 (typically 20 minutes face-to-face)  [] EVAL (MOD) 10058 (typically 30 minutes face-to-face)  [] EVAL (HIGH) 83609 (typically 45 minutes face-to-face)  [] RE-EVAL     [x] UA(50662) x1     [] IONTO  [x] NMR (42010) x  1   [] VASO  [x] Manual (55972) x 1    [] Other:  [] TA x      [] Mech Traction (43528)  [] ES(attended) (96577)      [] ES (un) (50524):     GOALS:     Long Term Goals: To be achieved in: 12 weeks  1. Disability index score of 15% or less for the LEFS  to assist with reaching prior level of function. [] Progressing: [] Met: [x] Not Met: [] Adjusted  2. Patient will demonstrate increased AROM to WNL to allow for proper joint functioning as indicated by patients Functional Deficits. [x] Progressing: [] Met: [] Not Met: [] Adjusted  3. Patient will demonstrate an increase in Strength to good proximal hip and core activation to allow for proper functional mobility as indicated by patients Functional Deficits. [x] Progressing: [] Met: [] Not Met: [] Adjusted  4. Patient will return to stairs and squatting functional activities without increased symptoms or restriction. [x] Progressing: [] Met: [] Not Met: [] Adjusted  5. Pt will transition to workout routine; GAP return to running. [x] Progressing: [] Met: [] Not Met: [] Adjusted     Progression Towards Functional goals:  [x] Patient is progressing as expected towards functional goals listed. [] Progression is slowed due to complexities listed. [] Progression has been slowed due to co-morbidities. [] Plan just implemented, too soon to assess goals progression  [] Other:     Overall Progression Towards Functional goals/ Treatment Progress Update:  [] Patient is progressing as expected towards functional goals listed. [] Progression is slowed due to complexities/Impairments listed. [] Progression has been slowed due to co-morbidities.   [x] Plan just implemented, too soon to assess goals progression <30days   [] Goals require adjustment due to lack of progress  [] Patient is not progressing as expected and requires additional follow up with physician  [] Other    Prognosis for POC: [x] Good [] Fair  [] Poor      Patient requires continued skilled intervention: [x] Yes  [] No    Treatment/Activity Tolerance:  [x] Patient able to complete treatment  [] Patient limited by fatigue  [] Patient limited by pain    [] Patient limited by other medical complications  [] Other:     Assessment:  Good muscle activation, but notable weakness in ADD/ABD, hip ext, and knee flexion. ROM improving. Able to perform step up anterior, lateral, and posterior. Trendelenburg with posterior step down. Knee valgus seen at 6 in step with anterior reach. Squat > 70 with lateral shift. Continue skilled PT to address these issues and return to PLOF. Return to Play: (if applicable)   []  Stage 1: Intro to Strength   []  Stage 2: Return to Run and Strength   []  Stage 3: Return to Jump and Strength   []  Stage 4: Dynamic Strength and Agility   []  Stage 5: Sport Specific Training     []  Ready to Return to Play, Meets All Above Stages   []  Not Ready for Return to Sports   Comments:                               PLAN: See eval  [x] Continue per plan of care [] Alter current plan (see comments above)  [] Plan of care initiated [] Hold pending MD visit [] Discharge      Electronically signed by:  Ev Quiroz PT    Note: If patient does not return for scheduled/ recommended follow up visits, this note will serve as a discharge from care along with most recent update on progress.

## 2021-11-04 ENCOUNTER — HOSPITAL ENCOUNTER (OUTPATIENT)
Dept: PHYSICAL THERAPY | Age: 51
Setting detail: THERAPIES SERIES
Discharge: HOME | End: 2021-11-04
Payer: COMMERCIAL

## 2021-11-04 PROCEDURE — 97140 MANUAL THERAPY 1/> REGIONS: CPT | Performed by: PHYSICAL THERAPIST

## 2021-11-04 PROCEDURE — 97110 THERAPEUTIC EXERCISES: CPT | Performed by: PHYSICAL THERAPIST

## 2021-11-04 PROCEDURE — 97112 NEUROMUSCULAR REEDUCATION: CPT | Performed by: PHYSICAL THERAPIST

## 2021-11-04 NOTE — FLOWSHEET NOTE
[address] [facility]      Physical Therapy Daily Treatment Note  Date:  2021    Patient Name:  Iris Aguirre    :  1970  MRN: [id number]  Restrictions/Precautions:    Physician Information:  Referring Practitioner: Eryn Leonardo  Medical/Treatment Diagnosis Information:  · Diagnosis: M09.991U (ICD-10-CM) - Right hamstring injury, subsequent encounter s/p R HS proximal repair 2021   Treatment Diagnosis:  Right hip pain M25.551   [] Conservative / [] Surgical - DOS:    Insurance/Certification information:     Plan of care signed: [] YES  [] NO  Number of Comorbidities:  []0     [x]1-2    []3+  Date of Patient follow up with Physician:       Is this a Progress Report:     [x]  Yes  []  No        If Yes:  Date Range for reporting period:  Beginning 2021  Ending 2021    Progress report will be due (10 Rx or 30 days whichever is less):        Recertification will be due (POC Duration  / 90 days whichever is less): 2021        Visit # Insurance Allowable   14 30   Telehealth        Latex Allergy:  [x]NO      []YES  Preferred Language for Healthcare:   [x]English       []other:    SUBJECTIVE:  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   OBJECTIVE:    Initial Initial Current   VAS 3  0   LEFS 99%  40%   ROM LEFT RIGHT    HIP Flex  65 99 L, 107 R   HIP Abd      HIP add      HIP Ext      HIP IR   20   HIP ER      Knee ext  -15 +4   Knee Flex  90 120   Ankle PF      Ankle DF      90-90 HS   38 L 44 R   ASLR   64 L, 72 R   Strength  LEFT RIGHT    HIP Flexors  4 4+   HIP Abductors   R 4-   HIP add   3+ B   HIP Ext   4   Hip ER      Hip IR      Knee EXT (quad)  Fair activation in 15 degrees flexion 5      Observation:    Test measurements:      RESTRICTIONS/PRECAUTIONS: see protocol media tab  Wk 5: 2021  Wk 6: 9/15/2021  Wk 10: 10/13/2021 **ankle weight PRE: 1# every week bridging SLR  Wk 12: 10/27/2021     Wk 0-4 2 weeks 50% WB  Knee ext block 15  Hip flexion <90    Wk 5-6 WBAT Hip flexion 90 No active HS, no active hip ext   Wk 6-12 No brace  Wean off crutches Active hip and knee flexion Bike  Hip ext, HSC anti-gravity   Mo 3-6  Full ROM  Gentle HS (S)    Mo 6-9      Access Code: [email or web address]  URL: [employer]/  Date: 10/05/2021  Prepared by: Mukund Hansen    Exercises  Standing Hip Abduction Kicks - 2-3 x daily - 7 x weekly - 2-3 sets - 10 reps  Standing Hip Adduction with Anchored Resistance - 2-3 x daily - 7 x weekly - 2-3 sets - 10 reps  Standing Hip Flexion with Resistance Loop - 2-3 x daily - 7 x weekly - 2-3 sets - 10 reps  Staggered Stance Squat - 2-3 x daily - 7 x weekly - 2-3 sets - 10 reps  Lunge Matrix - 2-3 x daily - 7 x weekly - 2-3 sets - 10 reps  Eccentric Heel Lowering on Step - 2-3 x daily - 7 x weekly - 2-3 sets - 10 reps  Single Leg Balance with Clock Reach - 2-3 x daily - 7 x weekly - 2-3 sets - 10 reps      Access Code: [email or web address]  URL: [employer]/  Date: 10/05/2021  Prepared by: Mukund Hansen    Exercises  Half Kneeling Hip Flexor Stretch with Posterior Pelvic Tilt and Dowel - 2-3 x daily - 7 x weekly - 2-3 sets - 10 reps  Half Kneeling Hip Flexor Stretch with Sidebend - 2-3 x daily - 7 x weekly - 2-3 sets - 10 reps  Supine Bilateral Hamstring Sets - 2-3 x daily - 7 x weekly - 2-3 sets - 10 reps  Sidelying Hip Abduction with Resistance at Thighs - 2-3 x daily - 7 x weekly - 2-3 sets - 10 reps  Sidelying Feet Elevated Clamshells - 2-3 x daily - 7 x weekly - 2-3 sets - 10 reps  Forward Monster Walks - 2-3 x daily - 7 x weekly - 2-3 sets - 10 reps  Squat with Chest Press - 2-3 x daily - 7 x weekly - 2-3 sets - 10 reps  Standard Plank - 2-3 x daily - 7 x weekly - 2-3 sets - 10 reps    Access Code: [email or web address]  URL: [email or web address]  Date: 10/05/2021  Prepared by: Mukund Hansen    Exercises  Half Kneeling Hip Flexor Stretch with Posterior Pelvic Tilt and Dowel - 2-3 x daily - 7 x weekly - 2-3 sets - 10 reps  Half Kneeling Hip Flexor Stretch with Sidebend - 2-3 x daily - 7 x weekly - 2-3 sets - 10 reps  Supine Bilateral Hamstring Sets - 2-3 x daily - 7 x weekly - 2-3 sets - 10 reps  Sidelying Hip Abduction with Resistance at Thighs - 2-3 x daily - 7 x weekly - 2-3 sets - 10 reps  Sidelying Feet Elevated Clamshells - 2-3 x daily - 7 x weekly - 2-3 sets - 10 reps  Forward Monster Walks - 2-3 x daily - 7 x weekly - 2-3 sets - 10 reps  Squat with Chest Press - 2-3 x daily - 7 x weekly - 2-3 sets - 10 reps  Standard Plank - 2-3 x daily - 7 x weekly - 2-3 sets - 10 reps    Exercises/Interventions:     Therapeutic Ex Sets/reps Notes   elliptical 3 min forward  3 min backward    HSC    Glider post, PL    Quadruped rock neutral, IR      Bird dog hip shift airex  Bird dog hip ext L HEP   Mod R hip ext       SB  Bridge HSC on floor  SB hip ext   SB flexion HS iso 90 NV   Prone HSC 1 pillow Using 6# at home   EMOM  Rev hypers  HS stool walks  1/2 kneel iso chop R/L 3 rounds 1 min ea      :30 each 1 min rest in between  3/10 HS exertion reported   Activation  ADD iso plank  sideplank ABD SLR  Bridge foam roll     EMOM  Posterior reach to march  Tall kneel bridge + TB ext  KDL   NV    ADD iso plank :3x10    sideplank ABD SLR          Manual Intervention     CFM HS origin/scar tissue     hypervolt  HS Activation 4 min  Relaxation 4 min Pre-workout  Post-workout   t/HS STW, gastroc, popliteus     IASTM sweeps medial and lateral HS     MRE ecc ER, IR, HSC ecc FINISHER         NMR re-education     SLS airex Hip flexion   MRE ecc ER, IR 1 pillow    D.D squat Chop NV    Box squat 24 in box                                  Therapeutic Exercise and NMR EXR  [x] (66117) Provided verbal/tactile cueing for activities related to strengthening, flexibility, endurance, ROM for improvements in LE, proximal hip, and core control with self care, mobility, lifting, ambulation.  [] (68317) Provided verbal/tactile cueing for activities related to improving balance, coordination, kinesthetic sense, posture, motor skill, proprioception  to assist with LE, proximal hip, and core control in self care, mobility, lifting, ambulation and eccentric single leg control. NMR and Therapeutic Activities:    [x] (76901 or 71535) Provided verbal/tactile cueing for activities related to improving balance, coordination, kinesthetic sense, posture, motor skill, proprioception and motor activation to allow for proper function of core, proximal hip and LE with self care and ADLs  [] (81697) Gait Re-education- Provided training and instruction to the patient for proper LE, core and proximal hip recruitment and positioning and eccentric body weight control with ambulation re-education including up and down stairs     Home Exercise Program:    [x] (20181) Reviewed/Progressed HEP activities related to strengthening, flexibility, endurance, ROM of core, proximal hip and LE for functional self-care, mobility, lifting and ambulation/stair navigation   [] (32699)Reviewed/Progressed HEP activities related to improving balance, coordination, kinesthetic sense, posture, motor skill, proprioception of core, proximal hip and LE for self care, mobility, lifting, and ambulation/stair navigation      Manual Treatments:  PROM / STM / Oscillations-Mobs:  G-I, II, III, IV (PA's, Inf., Post.)  [x] (39941) Provided manual therapy to mobilize LE, proximal hip and/or LS spine soft tissue/joints for the purpose of modulating pain, promoting relaxation,  increasing ROM, reducing/eliminating soft tissue swelling/inflammation/restriction, improving soft tissue extensibility and allowing for proper ROM for normal function with self care, mobility, lifting and ambulation.      Modalities:        [] GR/ESU 15 min    [] GR 15 min  [] ESU     [] CP    [] MHP    [x] declined     Charges:  Timed Code Treatment Minutes: 50   Total Treatment Minutes: 50     [facility] time in/time out:   (and requires time in and out for each CPT code)    [x] EVAL (LOW) 25243 (typically 20 minutes face-to-face)  [] EVAL (MOD) 81822 (typically 30 minutes face-to-face)  [] EVAL (HIGH) 20448 (typically 45 minutes face-to-face)  [] RE-EVAL     [x] AMBERLY(11365) x1     [] IONTO  [x] NMR (17881) x  1   [] VASO  [x] Manual (27392) x 1    [] Other:  [] TA x      [] Mech Traction (32394)  [] ES(attended) (61192)      [] ES (un) (31380):     GOALS:     Long Term Goals: To be achieved in: 12 weeks  1. Disability index score of 15% or less for the LEFS  to assist with reaching prior level of function. [] Progressing: [] Met: [x] Not Met: [] Adjusted  2. Patient will demonstrate increased AROM to WNL to allow for proper joint functioning as indicated by patients Functional Deficits. [x] Progressing: [] Met: [] Not Met: [] Adjusted  3. Patient will demonstrate an increase in Strength to good proximal hip and core activation to allow for proper functional mobility as indicated by patients Functional Deficits. [x] Progressing: [] Met: [] Not Met: [] Adjusted  4. Patient will return to stairs and squatting functional activities without increased symptoms or restriction. [x] Progressing: [] Met: [] Not Met: [] Adjusted  5. Pt will transition to workout routine; GAP return to running. [x] Progressing: [] Met: [] Not Met: [] Adjusted     Progression Towards Functional goals:  [x] Patient is progressing as expected towards functional goals listed. [] Progression is slowed due to complexities listed. [] Progression has been slowed due to co-morbidities. [] Plan just implemented, too soon to assess goals progression  [] Other:     Overall Progression Towards Functional goals/ Treatment Progress Update:  [] Patient is progressing as expected towards functional goals listed. [] Progression is slowed due to complexities/Impairments listed.   [] Progression has been slowed due to co-morbidities. [x] Plan just implemented, too soon to assess goals progression <30days   [] Goals require adjustment due to lack of progress  [] Patient is not progressing as expected and requires additional follow up with physician  [] Other    Prognosis for POC: [x] Good [] Fair  [] Poor      Patient requires continued skilled intervention: [x] Yes  [] No    Treatment/Activity Tolerance:  [x] Patient able to complete treatment  [] Patient limited by fatigue  [] Patient limited by pain    [] Patient limited by other medical complications  [] Other:     Assessment:  Good tolerance to EMOM. Reported HS activation felt and fatigue in muscle belly on 2nd round. Return to Play: (if applicable)   []  Stage 1: Intro to Strength   []  Stage 2: Return to Run and Strength   []  Stage 3: Return to Jump and Strength   []  Stage 4: Dynamic Strength and Agility   []  Stage 5: Sport Specific Training     []  Ready to Return to Play, Meets All Above Stages   []  Not Ready for Return to Sports   Comments:                               PLAN: See eval  [x] Continue per plan of care [] Alter current plan (see comments above)  [] Plan of care initiated [] Hold pending MD visit [] Discharge      Electronically signed by:  Jose Daniel Richards PT    Note: If patient does not return for scheduled/ recommended follow up visits, this note will serve as a discharge from care along with most recent update on progress.

## 2021-11-08 ENCOUNTER — HOSPITAL ENCOUNTER (OUTPATIENT)
Dept: PHYSICAL THERAPY | Age: 51
Setting detail: THERAPIES SERIES
Discharge: HOME | End: 2021-11-08
Payer: COMMERCIAL

## 2021-11-08 PROCEDURE — 97140 MANUAL THERAPY 1/> REGIONS: CPT | Performed by: PHYSICAL THERAPIST

## 2021-11-08 PROCEDURE — 97110 THERAPEUTIC EXERCISES: CPT | Performed by: PHYSICAL THERAPIST

## 2021-11-08 PROCEDURE — 97112 NEUROMUSCULAR REEDUCATION: CPT | Performed by: PHYSICAL THERAPIST

## 2021-11-08 NOTE — FLOWSHEET NOTE
[address] [facility], [address] [place] [address]      Physical Therapy Daily Treatment Note  Date:  2021    Patient Name:  Yadira Thibodeaux    :  1970  MRN: [id number]  Restrictions/Precautions:    Physician Information:  Referring Practitioner: Sachin Albarado  Medical/Treatment Diagnosis Information:  · Diagnosis: G47.907O (ICD-10-CM) - Right hamstring injury, subsequent encounter s/p R HS proximal repair 2021   Treatment Diagnosis:  Right hip pain M25.551   [] Conservative / [] Surgical - DOS:    Insurance/Certification information:     Plan of care signed: [] YES  [] NO  Number of Comorbidities:  []0     [x]1-2    []3+  Date of Patient follow up with Physician:       Is this a Progress Report:     [x]  Yes  []  No        If Yes:  Date Range for reporting period:  Beginning 2021  Ending 2021    Progress report will be due (10 Rx or 30 days whichever is less):        Recertification will be due (POC Duration  / 90 days whichever is less): 2021        Visit # Insurance Allowable   15 30   Telehealth        Latex Allergy:  [x]NO      []YES  Preferred Language for Healthcare:   [x]English       []other:    SUBJECTIVE:  Pt reports he finally got a good burn in the HS after LV. He feels improved strength every day.   OBJECTIVE:    Initial Initial Current   VAS 3  0   LEFS 99%  40%   ROM LEFT RIGHT    HIP Flex  65 99 L, 107 R   HIP Abd      HIP add      HIP Ext      HIP IR   20   HIP ER      Knee ext  -15 +4   Knee Flex  90 120   Ankle PF      Ankle DF      90-90 HS   38 L 44 R   ASLR   64 L, 72 R   Strength  LEFT RIGHT    HIP Flexors  4 4+   HIP Abductors   R 4-   HIP add   3+ B   HIP Ext   4   Hip ER      Hip IR      Knee EXT (quad)  Fair activation in 15 degrees flexion 5      Observation:    Test measurements:      RESTRICTIONS/PRECAUTIONS: see protocol media tab  Wk 5: 2021  Wk 6: 9/15/2021  Wk 10: 10/13/2021 **ankle weight PRE: 1# every week bridging SLR  Wk 12: 10/27/2021     Wk 0-4 2 weeks 50% WB  Knee ext block 15  Hip flexion <90    Wk 5-6 WBAT Hip flexion 90 No active HS, no active hip ext   Wk 6-12 No brace  Wean off crutches Active hip and knee flexion Bike  Hip ext, HSC anti-gravity   Mo 3-6  Full ROM  Gentle HS (S)    Mo 6-9      Access Code: [email or web address]  URL: [employer]/  Date: 10/05/2021  Prepared by: Demetria Race    Exercises  Standing Hip Abduction Kicks - 2-3 x daily - 7 x weekly - 2-3 sets - 10 reps  Standing Hip Adduction with Anchored Resistance - 2-3 x daily - 7 x weekly - 2-3 sets - 10 reps  Standing Hip Flexion with Resistance Loop - 2-3 x daily - 7 x weekly - 2-3 sets - 10 reps  Staggered Stance Squat - 2-3 x daily - 7 x weekly - 2-3 sets - 10 reps  Lunge Matrix - 2-3 x daily - 7 x weekly - 2-3 sets - 10 reps  Eccentric Heel Lowering on Step - 2-3 x daily - 7 x weekly - 2-3 sets - 10 reps  Single Leg Balance with Clock Reach - 2-3 x daily - 7 x weekly - 2-3 sets - 10 reps      Access Code: [email or web address]  URL: [employer]/  Date: 10/05/2021  Prepared by: Demetria Race    Exercises  Half Kneeling Hip Flexor Stretch with Posterior Pelvic Tilt and Dowel - 2-3 x daily - 7 x weekly - 2-3 sets - 10 reps  Half Kneeling Hip Flexor Stretch with Sidebend - 2-3 x daily - 7 x weekly - 2-3 sets - 10 reps  Supine Bilateral Hamstring Sets - 2-3 x daily - 7 x weekly - 2-3 sets - 10 reps  Sidelying Hip Abduction with Resistance at Thighs - 2-3 x daily - 7 x weekly - 2-3 sets - 10 reps  Sidelying Feet Elevated Clamshells - 2-3 x daily - 7 x weekly - 2-3 sets - 10 reps  Forward Monster Walks - 2-3 x daily - 7 x weekly - 2-3 sets - 10 reps  Squat with Chest Press - 2-3 x daily - 7 x weekly - 2-3 sets - 10 reps  Standard Plank - 2-3 x daily - 7 x weekly - 2-3 sets - 10 reps    Access Code: [email or web address]  URL: [email or web address]  Date: 10/05/2021  Prepared by: Clarissa Gromes    Exercises  Half Kneeling Hip Flexor Stretch with Posterior Pelvic Tilt and Dowel - 2-3 x daily - 7 x weekly - 2-3 sets - 10 reps  Half Kneeling Hip Flexor Stretch with Sidebend - 2-3 x daily - 7 x weekly - 2-3 sets - 10 reps  Supine Bilateral Hamstring Sets - 2-3 x daily - 7 x weekly - 2-3 sets - 10 reps  Sidelying Hip Abduction with Resistance at Thighs - 2-3 x daily - 7 x weekly - 2-3 sets - 10 reps  Sidelying Feet Elevated Clamshells - 2-3 x daily - 7 x weekly - 2-3 sets - 10 reps  Forward Monster Walks - 2-3 x daily - 7 x weekly - 2-3 sets - 10 reps  Squat with Chest Press - 2-3 x daily - 7 x weekly - 2-3 sets - 10 reps  Standard Plank - 2-3 x daily - 7 x weekly - 2-3 sets - 10 reps    Exercises/Interventions:     Therapeutic Ex Sets/reps Notes   elliptical 3 min forward  3 min backward    HSC    Glider post, PL    Supine running TB 3D x8 eachGTB   Bird dog hip shift airex  Bird dog hip ext L HEP   Mod R hip ext       SB  Bridge HSC on floor  SB hip ext   SB flexion HS iso 90 NV   Prone HSC 1 pillow Using 6# at home   EMOM  Rev hypers  HS stool walks  1/2 kneel iso chop R/L 3 rounds 1 min ea      :30 each 1 min rest in between  3/10 HS exertion reported   Activation  ADD iso plank  sideplank ABD SLR  Bridge foam roll NV    EMOM  Posterior reach to march  Tall kneel bridge + TB ext  KDL- table assist   1 min each x3    Purple/grey TB  :30 R/L No rest   ADD  Plank bent knee 3x8    sideplank ABD SLR          Manual Intervention     CFM HS origin/scar tissue     hypervolt  HS Activation 4 min  Relaxation 4 min Pre-workout  Post-workout   t/HS STW, gastroc, popliteus     IASTM sweeps medial and lateral HS     MRE ecc ER, IR, HSC ecc FINISHER         NMR re-education     Deadlift NV    Sumo DL NV    Donkey pulls  NV                                      Therapeutic Exercise and NMR EXR  [x] (97323) Provided verbal/tactile cueing for activities related to strengthening, flexibility, endurance, ROM for improvements in LE, proximal hip, and core control with self care, mobility, lifting, ambulation.  [] (11521) Provided verbal/tactile cueing for activities related to improving balance, coordination, kinesthetic sense, posture, motor skill, proprioception  to assist with LE, proximal hip, and core control in self care, mobility, lifting, ambulation and eccentric single leg control. NMR and Therapeutic Activities:    [x] (79245 or 50855) Provided verbal/tactile cueing for activities related to improving balance, coordination, kinesthetic sense, posture, motor skill, proprioception and motor activation to allow for proper function of core, proximal hip and LE with self care and ADLs  [] (88800) Gait Re-education- Provided training and instruction to the patient for proper LE, core and proximal hip recruitment and positioning and eccentric body weight control with ambulation re-education including up and down stairs     Home Exercise Program:    [x] (03849) Reviewed/Progressed HEP activities related to strengthening, flexibility, endurance, ROM of core, proximal hip and LE for functional self-care, mobility, lifting and ambulation/stair navigation   [] (96345)Reviewed/Progressed HEP activities related to improving balance, coordination, kinesthetic sense, posture, motor skill, proprioception of core, proximal hip and LE for self care, mobility, lifting, and ambulation/stair navigation      Manual Treatments:  PROM / STM / Oscillations-Mobs:  G-I, II, III, IV (PA's, Inf., Post.)  [x] (66035) Provided manual therapy to mobilize LE, proximal hip and/or LS spine soft tissue/joints for the purpose of modulating pain, promoting relaxation,  increasing ROM, reducing/eliminating soft tissue swelling/inflammation/restriction, improving soft tissue extensibility and allowing for proper ROM for normal function with self care, mobility, lifting and ambulation.      Modalities:        [] GR/ESU 15 min    [] GR 15 min  [] ESU [] CP    [] MHP    [x] declined     Charges:  Timed Code Treatment Minutes: 44   Total Treatment Minutes: 44     [facility] time in/time out:   (and requires time in and out for each CPT code)    [x] EVAL (LOW) 94530 (typically 20 minutes face-to-face)  [] EVAL (MOD) 23867 (typically 30 minutes face-to-face)  [] EVAL (HIGH) 07710 (typically 45 minutes face-to-face)  [] RE-EVAL     [x] UP(82403) x1     [] IONTO  [x] NMR (51153) x  1   [] VASO  [x] Manual (90607) x 1    [] Other:  [] TA x      [] Mech Traction (64218)  [] ES(attended) (19479)      [] ES (un) (91386):     GOALS:     Long Term Goals: To be achieved in: 12 weeks  1. Disability index score of 15% or less for the LEFS  to assist with reaching prior level of function. [] Progressing: [] Met: [x] Not Met: [] Adjusted  2. Patient will demonstrate increased AROM to WNL to allow for proper joint functioning as indicated by patients Functional Deficits. [x] Progressing: [] Met: [] Not Met: [] Adjusted  3. Patient will demonstrate an increase in Strength to good proximal hip and core activation to allow for proper functional mobility as indicated by patients Functional Deficits. [x] Progressing: [] Met: [] Not Met: [] Adjusted  4. Patient will return to stairs and squatting functional activities without increased symptoms or restriction. [x] Progressing: [] Met: [] Not Met: [] Adjusted  5. Pt will transition to workout routine; GAP return to running. [x] Progressing: [] Met: [] Not Met: [] Adjusted     Progression Towards Functional goals:  [x] Patient is progressing as expected towards functional goals listed. [] Progression is slowed due to complexities listed. [] Progression has been slowed due to co-morbidities. [] Plan just implemented, too soon to assess goals progression  [] Other:     Overall Progression Towards Functional goals/ Treatment Progress Update:  [] Patient is progressing as expected towards functional goals listed.     [] Progression is

## 2021-11-11 ENCOUNTER — HOSPITAL ENCOUNTER (OUTPATIENT)
Dept: PHYSICAL THERAPY | Age: 51
Setting detail: THERAPIES SERIES
Discharge: HOME | End: 2021-11-11
Payer: COMMERCIAL

## 2021-11-11 PROCEDURE — 97112 NEUROMUSCULAR REEDUCATION: CPT | Performed by: PHYSICAL THERAPIST

## 2021-11-11 PROCEDURE — 97140 MANUAL THERAPY 1/> REGIONS: CPT | Performed by: PHYSICAL THERAPIST

## 2021-11-11 PROCEDURE — 97110 THERAPEUTIC EXERCISES: CPT | Performed by: PHYSICAL THERAPIST

## 2021-11-11 NOTE — FLOWSHEET NOTE
[address] [facility] [address]      Physical Therapy Daily Treatment Note  Date:  2021    Patient Name:  Jazmine Rashid    :  1970  MRN: [id number]  Restrictions/Precautions:    Physician Information:  Referring Practitioner: Bertin Salamanca  Medical/Treatment Diagnosis Information:  · Diagnosis: S18.888T (ICD-10-CM) - Right hamstring injury, subsequent encounter s/p R HS proximal repair 2021   Treatment Diagnosis:  Right hip pain M25.551   [] Conservative / [] Surgical - DOS:    Insurance/Certification information:     Plan of care signed: [] YES  [] NO  Number of Comorbidities:  []0     [x]1-2    []3+  Date of Patient follow up with Physician:       Is this a Progress Report:     [x]  Yes  []  No        If Yes:  Date Range for reporting period:  Beginning 2021  Ending 2021    Progress report will be due (10 Rx or 30 days whichever is less):        Recertification will be due (POC Duration  / 90 days whichever is less): 2021        Visit # Insurance Allowable   16 30   Telehealth        Latex Allergy:  [x]NO      []YES  Preferred Language for Healthcare:   [x]English       []other:    SUBJECTIVE:  Pt reports doing well with the workouts.   OBJECTIVE:    Initial Initial Current   VAS 3  0   LEFS 99%  40%   ROM LEFT RIGHT    HIP Flex  65 99 L, 107 R   HIP Abd      HIP add      HIP Ext      HIP IR   20   HIP ER      Knee ext  -15 +4   Knee Flex  90 120   Ankle PF      Ankle DF      90-90 HS   38 L 44 R   ASLR   64 L, 72 R   Strength  LEFT RIGHT    HIP Flexors  4 4+   HIP Abductors   R 4-   HIP add   3+ B   HIP Ext   4   Hip ER      Hip IR      Knee EXT (quad)  Fair activation in 15 degrees flexion 5      Observation:    Test measurements:      RESTRICTIONS/PRECAUTIONS: see protocol media tab  Wk 5: 2021  Wk 6: 9/15/2021  Wk 10: 10/13/2021 **ankle weight PRE: 1# every week bridging SLR  Wk 12: 10/27/2021     Wk 0-4 2 weeks 50% WB  Knee ext block 15  Hip flexion <90    Wk 5-6 WBAT Hip flexion 90 No active HS, no active hip ext   Wk 6-12 No brace  Wean off crutches Active hip and knee flexion Bike  Hip ext, HSC anti-gravity   Mo 3-6  Full ROM  Gentle HS (S)    Mo 6-9      Access Code: [email or web address]  URL: [email or web address]  Date: 10/05/2021  Prepared by: Edward Tate    Exercises  Standing Hip Abduction Kicks - 2-3 x daily - 7 x weekly - 2-3 sets - 10 reps  Standing Hip Adduction with Anchored Resistance - 2-3 x daily - 7 x weekly - 2-3 sets - 10 reps  Standing Hip Flexion with Resistance Loop - 2-3 x daily - 7 x weekly - 2-3 sets - 10 reps  Staggered Stance Squat - 2-3 x daily - 7 x weekly - 2-3 sets - 10 reps  Lunge Matrix - 2-3 x daily - 7 x weekly - 2-3 sets - 10 reps  Eccentric Heel Lowering on Step - 2-3 x daily - 7 x weekly - 2-3 sets - 10 reps  Single Leg Balance with Clock Reach - 2-3 x daily - 7 x weekly - 2-3 sets - 10 reps      Access Code: [email or web address]  URL: [employer]/  Date: 10/05/2021  Prepared by: Edward Tate    Exercises  Half Kneeling Hip Flexor Stretch with Posterior Pelvic Tilt and Dowel - 2-3 x daily - 7 x weekly - 2-3 sets - 10 reps  Half Kneeling Hip Flexor Stretch with Sidebend - 2-3 x daily - 7 x weekly - 2-3 sets - 10 reps  Supine Bilateral Hamstring Sets - 2-3 x daily - 7 x weekly - 2-3 sets - 10 reps  Sidelying Hip Abduction with Resistance at Thighs - 2-3 x daily - 7 x weekly - 2-3 sets - 10 reps  Sidelying Feet Elevated Clamshells - 2-3 x daily - 7 x weekly - 2-3 sets - 10 reps  Forward Monster Walks - 2-3 x daily - 7 x weekly - 2-3 sets - 10 reps  Squat with Chest Press - 2-3 x daily - 7 x weekly - 2-3 sets - 10 reps  Standard Plank - 2-3 x daily - 7 x weekly - 2-3 sets - 10 reps    Access Code: [email or web address]  URL: [email or web address]  Date: 10/05/2021  Prepared by: Edward Tate    Exercises  Half Kneeling Hip Flexor Stretch with Posterior improvements in LE, proximal hip, and core control with self care, mobility, lifting, ambulation.  [] (91261) Provided verbal/tactile cueing for activities related to improving balance, coordination, kinesthetic sense, posture, motor skill, proprioception  to assist with LE, proximal hip, and core control in self care, mobility, lifting, ambulation and eccentric single leg control. NMR and Therapeutic Activities:    [x] (88428 or 16830) Provided verbal/tactile cueing for activities related to improving balance, coordination, kinesthetic sense, posture, motor skill, proprioception and motor activation to allow for proper function of core, proximal hip and LE with self care and ADLs  [] (19888) Gait Re-education- Provided training and instruction to the patient for proper LE, core and proximal hip recruitment and positioning and eccentric body weight control with ambulation re-education including up and down stairs     Home Exercise Program:    [x] (11137) Reviewed/Progressed HEP activities related to strengthening, flexibility, endurance, ROM of core, proximal hip and LE for functional self-care, mobility, lifting and ambulation/stair navigation   [] (77808)Reviewed/Progressed HEP activities related to improving balance, coordination, kinesthetic sense, posture, motor skill, proprioception of core, proximal hip and LE for self care, mobility, lifting, and ambulation/stair navigation      Manual Treatments:  PROM / STM / Oscillations-Mobs:  G-I, II, III, IV (PA's, Inf., Post.)  [x] (63620) Provided manual therapy to mobilize LE, proximal hip and/or LS spine soft tissue/joints for the purpose of modulating pain, promoting relaxation,  increasing ROM, reducing/eliminating soft tissue swelling/inflammation/restriction, improving soft tissue extensibility and allowing for proper ROM for normal function with self care, mobility, lifting and ambulation.      Modalities:        [] GR/ESU 15 min    [] GR 15 min  [] ESU [] CP    [] MHP    [x] declined     Charges:  Timed Code Treatment Minutes: 44   Total Treatment Minutes: 44     [employer] time in/time out:   (and requires time in and out for each CPT code)    [x] EVAL (LOW) 61236 (typically 20 minutes face-to-face)  [] EVAL (MOD) 52745 (typically 30 minutes face-to-face)  [] EVAL (HIGH) 72879 (typically 45 minutes face-to-face)  [] RE-EVAL     [x] ST(90842) x1     [] IONTO  [x] NMR (61342) x  1   [] VASO  [x] Manual (59541) x 1    [] Other:  [] TA x      [] Mech Traction (27748)  [] ES(attended) (42447)      [] ES (un) (44368):     GOALS:     Long Term Goals: To be achieved in: 12 weeks  1. Disability index score of 15% or less for the LEFS  to assist with reaching prior level of function. [] Progressing: [] Met: [x] Not Met: [] Adjusted  2. Patient will demonstrate increased AROM to WNL to allow for proper joint functioning as indicated by patients Functional Deficits. [x] Progressing: [] Met: [] Not Met: [] Adjusted  3. Patient will demonstrate an increase in Strength to good proximal hip and core activation to allow for proper functional mobility as indicated by patients Functional Deficits. [x] Progressing: [] Met: [] Not Met: [] Adjusted  4. Patient will return to stairs and squatting functional activities without increased symptoms or restriction. [x] Progressing: [] Met: [] Not Met: [] Adjusted  5. Pt will transition to workout routine; GAP return to running. [x] Progressing: [] Met: [] Not Met: [] Adjusted     Progression Towards Functional goals:  [x] Patient is progressing as expected towards functional goals listed. [] Progression is slowed due to complexities listed. [] Progression has been slowed due to co-morbidities. [] Plan just implemented, too soon to assess goals progression  [] Other:     Overall Progression Towards Functional goals/ Treatment Progress Update:  [] Patient is progressing as expected towards functional goals listed.     [] Progression is slowed due to complexities/Impairments listed. [] Progression has been slowed due to co-morbidities. [x] Plan just implemented, too soon to assess goals progression <30days   [] Goals require adjustment due to lack of progress  [] Patient is not progressing as expected and requires additional follow up with physician  [] Other    Prognosis for POC: [x] Good [] Fair  [] Poor      Patient requires continued skilled intervention: [x] Yes  [] No    Treatment/Activity Tolerance:  [x] Patient able to complete treatment  [] Patient limited by fatigue  [] Patient limited by pain    [] Patient limited by other medical complications  [] Other:     Assessment:  More difficulty with RLE stance and L HSC at TM. Able to maintain flat back during DL with cues for lats and subscap. Return to Play: (if applicable)   []  Stage 1: Intro to Strength   []  Stage 2: Return to Run and Strength   []  Stage 3: Return to Jump and Strength   []  Stage 4: Dynamic Strength and Agility   []  Stage 5: Sport Specific Training     []  Ready to Return to Play, Meets All Above Stages   []  Not Ready for Return to Sports   Comments:                               PLAN: See eval  [x] Continue per plan of care [] Alter current plan (see comments above)  [] Plan of care initiated [] Hold pending MD visit [] Discharge      Electronically signed by:  Ev Quiroz PT    Note: If patient does not return for scheduled/ recommended follow up visits, this note will serve as a discharge from care along with most recent update on progress.

## 2021-11-11 NOTE — Clinical Note
Dr Jacinta Flores,  Rick Childs are seeing [facility] tomorrow. He is doing extremely well in PT. We are working on bilateral and single closed chain exercises. He still has proximal hamstring weakness as expected, but overall is moving forward and is diligent in his exercise regimen. He may have some specific questions for you in terms of return to spinning and skiing.   Nabor Soares

## 2021-11-12 ENCOUNTER — OFFICE VISIT (OUTPATIENT)
Dept: ORTHOPEDIC SURGERY | Age: 51
End: 2021-11-12
Payer: COMMERCIAL

## 2021-11-12 VITALS — WEIGHT: 174 LBS | HEIGHT: 71 IN | BODY MASS INDEX: 24.36 KG/M2

## 2021-11-12 DIAGNOSIS — S76.301D RIGHT HAMSTRING INJURY, SUBSEQUENT ENCOUNTER: Primary | ICD-10-CM

## 2021-11-12 PROCEDURE — 1036F TOBACCO NON-USER: CPT | Performed by: ORTHOPAEDIC SURGERY

## 2021-11-12 PROCEDURE — G8420 CALC BMI NORM PARAMETERS: HCPCS | Performed by: ORTHOPAEDIC SURGERY

## 2021-11-12 PROCEDURE — 3017F COLORECTAL CA SCREEN DOC REV: CPT | Performed by: ORTHOPAEDIC SURGERY

## 2021-11-12 PROCEDURE — G8427 DOCREV CUR MEDS BY ELIG CLIN: HCPCS | Performed by: ORTHOPAEDIC SURGERY

## 2021-11-12 PROCEDURE — G8484 FLU IMMUNIZE NO ADMIN: HCPCS | Performed by: ORTHOPAEDIC SURGERY

## 2021-11-12 PROCEDURE — 99213 OFFICE O/P EST LOW 20 MIN: CPT | Performed by: ORTHOPAEDIC SURGERY

## 2021-11-15 ENCOUNTER — HOSPITAL ENCOUNTER (OUTPATIENT)
Dept: PHYSICAL THERAPY | Age: 51
Setting detail: THERAPIES SERIES
Discharge: HOME | End: 2021-11-15
Payer: COMMERCIAL

## 2021-11-15 PROCEDURE — 97140 MANUAL THERAPY 1/> REGIONS: CPT | Performed by: PHYSICAL THERAPIST

## 2021-11-15 PROCEDURE — 97110 THERAPEUTIC EXERCISES: CPT | Performed by: PHYSICAL THERAPIST

## 2021-11-15 PROCEDURE — 97112 NEUROMUSCULAR REEDUCATION: CPT | Performed by: PHYSICAL THERAPIST

## 2021-11-15 NOTE — FLOWSHEET NOTE
The [facility], [address] [facility], [address] [place], [place]      Physical Therapy Daily Treatment Note  Date:  11/15/2021    Patient Name:  Betzy Delacruz    :  1970  MRN: [id number]  Restrictions/Precautions:    Physician Information:  Referring Practitioner: Jesse Russell  Medical/Treatment Diagnosis Information:  · Diagnosis: P43.123A (ICD-10-CM) - Right hamstring injury, subsequent encounter s/p R HS proximal repair 2021   Treatment Diagnosis:  Right hip pain M25.551   [] Conservative / [] Surgical - DOS:    Insurance/Certification information:     Plan of care signed: [] YES  [] NO  Number of Comorbidities:  []0     [x]1-2    []3+  Date of Patient follow up with Physician:       Is this a Progress Report:     [x]  Yes  []  No        If Yes:  Date Range for reporting period:  Beginning 2021  Ending 2021    Progress report will be due (10 Rx or 30 days whichever is less): 5568       Recertification will be due (POC Duration  / 90 days whichever is less): 2021        Visit # Insurance Allowable   17 30   Telehealth        Latex Allergy:  [x]NO      []YES  Preferred Language for Healthcare:   [x]English       []other:    SUBJECTIVE:  MD is ok with return to anything.   OBJECTIVE:    Initial Initial Current   VAS 3  0   LEFS 99%  40%   ROM LEFT RIGHT    HIP Flex  65 99 L, 107 R   HIP Abd      HIP add      HIP Ext      HIP IR   20   HIP ER      Knee ext  -15 +4   Knee Flex  90 120   Ankle PF      Ankle DF      90-90 HS   38 L 44 R   ASLR   64 L, 72 R   Strength  LEFT RIGHT    HIP Flexors  4 4+   HIP Abductors   R 4-   HIP add   3+ B   HIP Ext   4   Hip ER      Hip IR      Knee EXT (quad)  Fair activation in 15 degrees flexion 5      Observation:    Test measurements:      RESTRICTIONS/PRECAUTIONS: see protocol media tab  Wk 5: 2021  Wk 6: 9/15/2021  Wk 10: 10/13/2021 **ankle weight PRE: 1# every week bridging SLR  Wk 12: 10/27/2021     Wk 0-4 2 weeks 50% WB  Knee ext block 15  Hip flexion <90    Wk 5-6 WBAT Hip flexion 90 No active HS, no active hip ext   Wk 6-12 No brace  Wean off crutches Active hip and knee flexion Bike  Hip ext, HSC anti-gravity   Mo 3-6  Full ROM  Gentle HS (S)    Mo 6-9      Access Code: [email or web address]  URL: [employer]/  Date: 10/05/2021  Prepared by: Heena Cuevas    Exercises  Standing Hip Abduction Kicks - 2-3 x daily - 7 x weekly - 2-3 sets - 10 reps  Standing Hip Adduction with Anchored Resistance - 2-3 x daily - 7 x weekly - 2-3 sets - 10 reps  Standing Hip Flexion with Resistance Loop - 2-3 x daily - 7 x weekly - 2-3 sets - 10 reps  Staggered Stance Squat - 2-3 x daily - 7 x weekly - 2-3 sets - 10 reps  Lunge Matrix - 2-3 x daily - 7 x weekly - 2-3 sets - 10 reps  Eccentric Heel Lowering on Step - 2-3 x daily - 7 x weekly - 2-3 sets - 10 reps  Single Leg Balance with Clock Reach - 2-3 x daily - 7 x weekly - 2-3 sets - 10 reps      Access Code: [email or web address]  URL: [employer]/  Date: 10/05/2021  Prepared by: Heena Cuevas    Exercises  Half Kneeling Hip Flexor Stretch with Posterior Pelvic Tilt and Dowel - 2-3 x daily - 7 x weekly - 2-3 sets - 10 reps  Half Kneeling Hip Flexor Stretch with Sidebend - 2-3 x daily - 7 x weekly - 2-3 sets - 10 reps  Supine Bilateral Hamstring Sets - 2-3 x daily - 7 x weekly - 2-3 sets - 10 reps  Sidelying Hip Abduction with Resistance at Thighs - 2-3 x daily - 7 x weekly - 2-3 sets - 10 reps  Sidelying Feet Elevated Clamshells - 2-3 x daily - 7 x weekly - 2-3 sets - 10 reps  Forward Monster Walks - 2-3 x daily - 7 x weekly - 2-3 sets - 10 reps  Squat with Chest Press - 2-3 x daily - 7 x weekly - 2-3 sets - 10 reps  Standard Plank - 2-3 x daily - 7 x weekly - 2-3 sets - 10 reps    Access Code: [email or web address]  URL: [email or web address]  Date: 10/05/2021  Prepared by: Heena Cuevas    Exercises  Half Kneeling Hip Flexor Stretch with Posterior Pelvic Tilt and Dowel - 2-3 x daily - 7 x weekly - 2-3 sets - 10 reps  Half Kneeling Hip Flexor Stretch with Sidebend - 2-3 x daily - 7 x weekly - 2-3 sets - 10 reps  Supine Bilateral Hamstring Sets - 2-3 x daily - 7 x weekly - 2-3 sets - 10 reps  Sidelying Hip Abduction with Resistance at Thighs - 2-3 x daily - 7 x weekly - 2-3 sets - 10 reps  Sidelying Feet Elevated Clamshells - 2-3 x daily - 7 x weekly - 2-3 sets - 10 reps  Forward Monster Walks - 2-3 x daily - 7 x weekly - 2-3 sets - 10 reps  Squat with Chest Press - 2-3 x daily - 7 x weekly - 2-3 sets - 10 reps  Standard Plank - 2-3 x daily - 7 x weekly - 2-3 sets - 10 reps    Exercises/Interventions:     Therapeutic Ex Sets/reps Notes   elliptical 3 min forward  3 min backward    HSC    Glider post, PL    Supine running TB 3D x8 eachGTB   Bird dog hip shift airex  Bird dog hip ext L HEP   Mod R hip ext       SB  Bridge HSC on floor  SB hip ext   SB flexion HS iso 90 NV   Prone HSC 1 pillow Using 6# at home   EMOM  Rev hypers  HS stool walks  1/2 kneel iso chop R/L  1 min rest in between  3/10 HS exertion reported   Activation  ADD iso plank  sideplank ABD SLR  Bridge foam roll NV    EMOM  Posterior reach to march  Tall kneel bridge + TB ext  KDL- table assist    No rest   EMOM  DL deadlift   TM HSC  bosu squat MB toss       Walking high knee/ baby skip  Donkey pull  KDL rearfoot elevated   10 ft x4 each  8pl 2x10  Discussed for HEP    SL squat to 22 in box forward leg elev x10 R/L    HSC machine ecc x10 30#   SL DL  To 6 in box 2x10 R/L 5# reach , release   Manual Intervention     CFM HS origin/scar tissue     hypervolt  HS Activation 4 min  Relaxation 4 min Pre-workout  Post-workout   t/HS STW, gastroc, popliteus     IASTM sweeps medial and lateral HS     MRE ecc ER, IR, HSC ecc FINISHER         NMR re-education     Deadlift Discussed lifting for strength    Sumo DL Discussed for HEP                                           Therapeutic Exercise and NMR EXR  [x] (21556) Provided verbal/tactile cueing for activities related to strengthening, flexibility, endurance, ROM for improvements in LE, proximal hip, and core control with self care, mobility, lifting, ambulation.  [] (70999) Provided verbal/tactile cueing for activities related to improving balance, coordination, kinesthetic sense, posture, motor skill, proprioception  to assist with LE, proximal hip, and core control in self care, mobility, lifting, ambulation and eccentric single leg control.      NMR and Therapeutic Activities:    [x] (28557 or 91526) Provided verbal/tactile cueing for activities related to improving balance, coordination, kinesthetic sense, posture, motor skill, proprioception and motor activation to allow for proper function of core, proximal hip and LE with self care and ADLs  [] (32971) Gait Re-education- Provided training and instruction to the patient for proper LE, core and proximal hip recruitment and positioning and eccentric body weight control with ambulation re-education including up and down stairs     Home Exercise Program:    [x] (70559) Reviewed/Progressed HEP activities related to strengthening, flexibility, endurance, ROM of core, proximal hip and LE for functional self-care, mobility, lifting and ambulation/stair navigation   [] (32220)Reviewed/Progressed HEP activities related to improving balance, coordination, kinesthetic sense, posture, motor skill, proprioception of core, proximal hip and LE for self care, mobility, lifting, and ambulation/stair navigation      Manual Treatments:  PROM / STM / Oscillations-Mobs:  G-I, II, III, IV (PA's, Inf., Post.)  [x] (85988) Provided manual therapy to mobilize LE, proximal hip and/or LS spine soft tissue/joints for the purpose of modulating pain, promoting relaxation,  increasing ROM, reducing/eliminating soft tissue swelling/inflammation/restriction, improving soft tissue extensibility and allowing for proper ROM for normal function with self care, mobility, lifting and ambulation. Modalities:        [] GR/ESU 15 min    [] GR 15 min  [] ESU     [] CP    [] MHP    [x] declined     Charges:  Timed Code Treatment Minutes: 44   Total Treatment Minutes: 44     [facility] time in/time out:   (and requires time in and out for each CPT code)    [x] EVAL (LOW) 40007 (typically 20 minutes face-to-face)  [] EVAL (MOD) 49379 (typically 30 minutes face-to-face)  [] EVAL (HIGH) 30815 (typically 45 minutes face-to-face)  [] RE-EVAL     [x] PO(84592) x1     [] IONTO  [x] NMR (52411) x  1   [] VASO  [x] Manual (70403) x 1    [] Other:  [] TA x      [] Mech Traction (20621)  [] ES(attended) (95843)      [] ES (un) (26007):     GOALS:     Long Term Goals: To be achieved in: 12 weeks  1. Disability index score of 15% or less for the LEFS  to assist with reaching prior level of function. [] Progressing: [] Met: [x] Not Met: [] Adjusted  2. Patient will demonstrate increased AROM to WNL to allow for proper joint functioning as indicated by patients Functional Deficits. [x] Progressing: [] Met: [] Not Met: [] Adjusted  3. Patient will demonstrate an increase in Strength to good proximal hip and core activation to allow for proper functional mobility as indicated by patients Functional Deficits. [x] Progressing: [] Met: [] Not Met: [] Adjusted  4. Patient will return to stairs and squatting functional activities without increased symptoms or restriction. [x] Progressing: [] Met: [] Not Met: [] Adjusted  5. Pt will transition to workout routine; GAP return to running. [x] Progressing: [] Met: [] Not Met: [] Adjusted     Progression Towards Functional goals:  [x] Patient is progressing as expected towards functional goals listed. [] Progression is slowed due to complexities listed. [] Progression has been slowed due to co-morbidities.   [] Plan just implemented, too soon to assess goals progression  [] Other:     Overall Progression Towards Functional goals/ Treatment Progress Update:  [] Patient is progressing as expected towards functional goals listed. [] Progression is slowed due to complexities/Impairments listed. [] Progression has been slowed due to co-morbidities. [x] Plan just implemented, too soon to assess goals progression <30days   [] Goals require adjustment due to lack of progress  [] Patient is not progressing as expected and requires additional follow up with physician  [] Other    Prognosis for POC: [x] Good [] Fair  [] Poor      Patient requires continued skilled intervention: [x] Yes  [] No    Treatment/Activity Tolerance:  [x] Patient able to complete treatment  [] Patient limited by fatigue  [] Patient limited by pain    [] Patient limited by other medical complications  [] Other:     Assessment:  Discussed lifting for strength 1-2x/wk with double leg and single leg efforts. Accessory work, plyos, and endurance on in between days. Return to Play: (if applicable)   []  Stage 1: Intro to Strength   []  Stage 2: Return to Run and Strength   []  Stage 3: Return to Jump and Strength   []  Stage 4: Dynamic Strength and Agility   []  Stage 5: Sport Specific Training     []  Ready to Return to Play, Meets All Above Stages   []  Not Ready for Return to Sports   Comments:                               PLAN: See eval  [x] Continue per plan of care [] Alter current plan (see comments above)  [] Plan of care initiated [] Hold pending MD visit [] Discharge      Electronically signed by:  Junior Peralta PT    Note: If patient does not return for scheduled/ recommended follow up visits, this note will serve as a discharge from care along with most recent update on progress.

## 2021-11-18 ENCOUNTER — HOSPITAL ENCOUNTER (OUTPATIENT)
Dept: PHYSICAL THERAPY | Age: 51
Setting detail: THERAPIES SERIES
Discharge: HOME | End: 2021-11-18
Payer: COMMERCIAL

## 2021-11-18 PROCEDURE — 97140 MANUAL THERAPY 1/> REGIONS: CPT | Performed by: PHYSICAL THERAPIST

## 2021-11-18 PROCEDURE — 97110 THERAPEUTIC EXERCISES: CPT | Performed by: PHYSICAL THERAPIST

## 2021-11-18 PROCEDURE — 97112 NEUROMUSCULAR REEDUCATION: CPT | Performed by: PHYSICAL THERAPIST

## 2021-11-18 NOTE — FLOWSHEET NOTE
[address] [facility] [address]      Physical Therapy Daily Treatment Note  Date:  2021    Patient Name:  Jazmine Rashid    :  1970  MRN: [id number]  Restrictions/Precautions:    Physician Information:  Referring Practitioner: Bertin Salamanca  Medical/Treatment Diagnosis Information:  · Diagnosis: K79.793N (ICD-10-CM) - Right hamstring injury, subsequent encounter s/p R HS proximal repair 2021   Treatment Diagnosis:  Right hip pain M25.551   [] Conservative / [] Surgical - DOS:    Insurance/Certification information:     Plan of care signed: [] YES  [] NO  Number of Comorbidities:  []0     [x]1-2    []3+  Date of Patient follow up with Physician:       Is this a Progress Report:     [x]  Yes  []  No        If Yes:  Date Range for reporting period:  Beginning 2021  Ending 2021    Progress report will be due (10 Rx or 30 days whichever is less):        Recertification will be due (POC Duration  / 90 days whichever is less): 2021        Visit # Insurance Allowable   18 30   Telehealth        Latex Allergy:  [x]NO      []YES  Preferred Language for Healthcare:   [x]English       []other:    SUBJECTIVE:  Pt reports a little soreness medially.   OBJECTIVE:    Initial Initial Current   VAS 3  0   LEFS 99%  40%   ROM LEFT RIGHT    HIP Flex  65 99 L, 107 R   HIP Abd      HIP add      HIP Ext      HIP IR   20   HIP ER      Knee ext  -15 +4   Knee Flex  90 120   Ankle PF      Ankle DF      90-90 HS   38 L 44 R   ASLR   64 L, 72 R   Strength  LEFT RIGHT    HIP Flexors  4 4+   HIP Abductors   R 4-   HIP add   3+ B   HIP Ext   4   Hip ER      Hip IR      Knee EXT (quad)  Fair activation in 15 degrees flexion 5      Observation:    Test measurements:      RESTRICTIONS/PRECAUTIONS: see protocol media tab  Wk 5: 2021  Wk 6: 9/15/2021  Wk 10: 10/13/2021 **ankle weight PRE: 1# every week bridging SLR  Wk 12: Tilt and Dowel - 2-3 x daily - 7 x weekly - 2-3 sets - 10 reps  Half Kneeling Hip Flexor Stretch with Sidebend - 2-3 x daily - 7 x weekly - 2-3 sets - 10 reps  Supine Bilateral Hamstring Sets - 2-3 x daily - 7 x weekly - 2-3 sets - 10 reps  Sidelying Hip Abduction with Resistance at Thighs - 2-3 x daily - 7 x weekly - 2-3 sets - 10 reps  Sidelying Feet Elevated Clamshells - 2-3 x daily - 7 x weekly - 2-3 sets - 10 reps  Forward Monster Walks - 2-3 x daily - 7 x weekly - 2-3 sets - 10 reps  Squat with Chest Press - 2-3 x daily - 7 x weekly - 2-3 sets - 10 reps  Standard Plank - 2-3 x daily - 7 x weekly - 2-3 sets - 10 reps    Exercises/Interventions:     Therapeutic Ex Sets/reps Notes   elliptical    HSC    Glider post, PL    Supine running TB 3D GTB   Bird dog hip shift airex  Bird dog hip ext L   Mod R hip ext       SB  Bridge HSC on floor  SB hip ext   SB flexion HS iso 90 NV   Prone HSC 1 pillow Using 6# at home   EMOM  Rev hypers  HS stool walks  1/2 kneel iso chop R/L  1 min rest in between  3/10 HS exertion reported   Activation  ADD iso plank  sideplank ABD SLR  Bridge foam roll     EMOM  Posterior reach to march  Tall kneel bridge + TB ext  KDL- table assist    No rest   EMOM  DL deadlift   TM HSC  bosu squat MB toss       Walking high knee/ baby skip  Donkey pull  KDL rearfoot elevated    SL squat to 22 in box forward leg elev    HSC machine ecc 30#   SL DL  To 6 in box 5# reach , release   Manual Intervention     CFM HS origin/scar tissue  SL ADD \"Stick\" 10 min  4 min    hypervolt  HS  Pre-workout  Post-workout   t/HS STW, gastroc, popliteus     IASTM sweeps medial and lateral HS     3D SKTC MRE conc x12 each    Prone IR ROM x10    NMR re-education     Deadlift Discussed lifting for strength    Sumo DL Discussed for HEP         Cook band SL deadlift 2x10 R/L    t-pose x10 R/L Max cues   Cook band SL deadlift 2x10 R/L                       Therapeutic Exercise and NMR EXR  [x] (15795) Provided verbal/tactile cueing for activities related to strengthening, flexibility, endurance, ROM for improvements in LE, proximal hip, and core control with self care, mobility, lifting, ambulation.  [] (81581) Provided verbal/tactile cueing for activities related to improving balance, coordination, kinesthetic sense, posture, motor skill, proprioception  to assist with LE, proximal hip, and core control in self care, mobility, lifting, ambulation and eccentric single leg control.      NMR and Therapeutic Activities:    [x] (12779 or 14805) Provided verbal/tactile cueing for activities related to improving balance, coordination, kinesthetic sense, posture, motor skill, proprioception and motor activation to allow for proper function of core, proximal hip and LE with self care and ADLs  [] (04158) Gait Re-education- Provided training and instruction to the patient for proper LE, core and proximal hip recruitment and positioning and eccentric body weight control with ambulation re-education including up and down stairs     Home Exercise Program:    [x] (14038) Reviewed/Progressed HEP activities related to strengthening, flexibility, endurance, ROM of core, proximal hip and LE for functional self-care, mobility, lifting and ambulation/stair navigation   [] (70197)Reviewed/Progressed HEP activities related to improving balance, coordination, kinesthetic sense, posture, motor skill, proprioception of core, proximal hip and LE for self care, mobility, lifting, and ambulation/stair navigation      Manual Treatments:  PROM / STM / Oscillations-Mobs:  G-I, II, III, IV (PA's, Inf., Post.)  [x] (67000) Provided manual therapy to mobilize LE, proximal hip and/or LS spine soft tissue/joints for the purpose of modulating pain, promoting relaxation,  increasing ROM, reducing/eliminating soft tissue swelling/inflammation/restriction, improving soft tissue extensibility and allowing for proper ROM for normal function with self care, mobility, lifting and ambulation. Modalities:        [] GR/ESU 15 min    [] GR 15 min  [] ESU     [] CP    [] MHP    [x] declined     Charges:  Timed Code Treatment Minutes: 44   Total Treatment Minutes: 44     BWC time in/time out:   (and requires time in and out for each CPT code)    [x] EVAL (LOW) 68086 (typically 20 minutes face-to-face)  [] EVAL (MOD) 84683 (typically 30 minutes face-to-face)  [] EVAL (HIGH) 78656 (typically 45 minutes face-to-face)  [] RE-EVAL     [x] LV(20054) x1     [] IONTO  [x] NMR (19860) x  1   [] VASO  [x] Manual (18668) x 1    [] Other:  [] TA x      [] Mech Traction (88838)  [] ES(attended) (95843)      [] ES (un) (12776):     GOALS:     Long Term Goals: To be achieved in: 12 weeks  1. Disability index score of 15% or less for the LEFS  to assist with reaching prior level of function. [] Progressing: [] Met: [x] Not Met: [] Adjusted  2. Patient will demonstrate increased AROM to WNL to allow for proper joint functioning as indicated by patients Functional Deficits. [x] Progressing: [] Met: [] Not Met: [] Adjusted  3. Patient will demonstrate an increase in Strength to good proximal hip and core activation to allow for proper functional mobility as indicated by patients Functional Deficits. [x] Progressing: [] Met: [] Not Met: [] Adjusted  4. Patient will return to stairs and squatting functional activities without increased symptoms or restriction. [x] Progressing: [] Met: [] Not Met: [] Adjusted  5. Pt will transition to workout routine; GAP return to running. [x] Progressing: [] Met: [] Not Met: [] Adjusted     Progression Towards Functional goals:  [x] Patient is progressing as expected towards functional goals listed. [] Progression is slowed due to complexities listed. [] Progression has been slowed due to co-morbidities.   [] Plan just implemented, too soon to assess goals progression  [] Other:     Overall Progression Towards Functional goals/ Treatment Progress Update:  [] Patient is progressing as expected towards functional goals listed. [] Progression is slowed due to complexities/Impairments listed. [] Progression has been slowed due to co-morbidities. [x] Plan just implemented, too soon to assess goals progression <30days   [] Goals require adjustment due to lack of progress  [] Patient is not progressing as expected and requires additional follow up with physician  [] Other    Prognosis for POC: [x] Good [] Fair  [] Poor      Patient requires continued skilled intervention: [x] Yes  [] No    Treatment/Activity Tolerance:  [x] Patient able to complete treatment  [] Patient limited by fatigue  [] Patient limited by pain    [] Patient limited by other medical complications  [] Other:     Assessment:  Decreased soreness post-manuals. Continue to work hip rotation strength and hip ext strength. Return to Play: (if applicable)   []  Stage 1: Intro to Strength   []  Stage 2: Return to Run and Strength   []  Stage 3: Return to Jump and Strength   []  Stage 4: Dynamic Strength and Agility   []  Stage 5: Sport Specific Training     []  Ready to Return to Play, Meets All Above Stages   []  Not Ready for Return to Sports   Comments:                               PLAN: See eval  [x] Continue per plan of care [] Alter current plan (see comments above)  [] Plan of care initiated [] Hold pending MD visit [] Discharge      Electronically signed by:  Oksana Roy PT    Note: If patient does not return for scheduled/ recommended follow up visits, this note will serve as a discharge from care along with most recent update on progress.

## 2021-11-22 ENCOUNTER — HOSPITAL ENCOUNTER (OUTPATIENT)
Dept: PHYSICAL THERAPY | Age: 51
Setting detail: THERAPIES SERIES
Discharge: HOME | End: 2021-11-22
Payer: COMMERCIAL

## 2021-11-22 PROCEDURE — 97140 MANUAL THERAPY 1/> REGIONS: CPT | Performed by: PHYSICAL THERAPIST

## 2021-11-22 PROCEDURE — 97112 NEUROMUSCULAR REEDUCATION: CPT | Performed by: PHYSICAL THERAPIST

## 2021-11-22 PROCEDURE — 97110 THERAPEUTIC EXERCISES: CPT | Performed by: PHYSICAL THERAPIST

## 2021-11-22 NOTE — FLOWSHEET NOTE
[facility] [address], [address] [place] [address]      Physical Therapy Daily Treatment Note  Date:  2021    Patient Name:  Sukumar Jackson    :  1970  MRN: [id number]  Restrictions/Precautions:    Physician Information:  Referring Practitioner: Thu Barraza  Medical/Treatment Diagnosis Information:  · Diagnosis: A66.853O (ICD-10-CM) - Right hamstring injury, subsequent encounter s/p R HS proximal repair 2021   Treatment Diagnosis:  Right hip pain M25.551   [] Conservative / [] Surgical - DOS:    Insurance/Certification information:     Plan of care signed: [] YES  [] NO  Number of Comorbidities:  []0     [x]1-2    []3+  Date of Patient follow up with Physician:       Is this a Progress Report:     [x]  Yes  []  No        If Yes:  Date Range for reporting period:  Beginning 2021  Ending 2021    Progress report will be due (10 Rx or 30 days whichever is less):        Recertification will be due (POC Duration  / 90 days whichever is less): 2021        Visit # Insurance Allowable   19 30   Telehealth        Latex Allergy:  [x]NO      []YES  Preferred Language for Healthcare:   [x]English       []other:    SUBJECTIVE:  Pt reports more soreness medially.   OBJECTIVE:    Initial Initial Current   VAS 3  0   LEFS 99%  40%   ROM LEFT RIGHT    HIP Flex  65 99 L, 107 R   HIP Abd      HIP add      HIP Ext      HIP IR   20   HIP ER      Knee ext  -15 +4   Knee Flex  90 120   Ankle PF      Ankle DF      90-90 HS   38 L 44 R   ASLR   64 L, 72 R   Strength  LEFT RIGHT    HIP Flexors  4 4+   HIP Abductors   R 4-   HIP add   3+ B   HIP Ext   4   Hip ER      Hip IR      Knee EXT (quad)  Fair activation in 15 degrees flexion 5      Observation:    Test measurements:      RESTRICTIONS/PRECAUTIONS: see protocol media tab  Wk 5: 2021  Wk 6: 9/15/2021  Wk 10: 10/13/2021 **ankle weight PRE: 1# every week bridging SLR  Wk 12: 10/27/2021     Wk 0-4 2 weeks 50% WB  Knee ext block 15  Hip flexion <90    Wk 5-6 WBAT Hip flexion 90 No active HS, no active hip ext   Wk 6-12 No brace  Wean off crutches Active hip and knee flexion Bike  Hip ext, HSC anti-gravity   Mo 3-6  Full ROM  Gentle HS (S)    Mo 6-9      Access Code: [email or web address]  URL: [facility]/  Date: 10/05/2021  Prepared by: Mukund Hansen    Exercises  Standing Hip Abduction Kicks - 2-3 x daily - 7 x weekly - 2-3 sets - 10 reps  Standing Hip Adduction with Anchored Resistance - 2-3 x daily - 7 x weekly - 2-3 sets - 10 reps  Standing Hip Flexion with Resistance Loop - 2-3 x daily - 7 x weekly - 2-3 sets - 10 reps  Staggered Stance Squat - 2-3 x daily - 7 x weekly - 2-3 sets - 10 reps  Lunge Matrix - 2-3 x daily - 7 x weekly - 2-3 sets - 10 reps  Eccentric Heel Lowering on Step - 2-3 x daily - 7 x weekly - 2-3 sets - 10 reps  Single Leg Balance with Clock Reach - 2-3 x daily - 7 x weekly - 2-3 sets - 10 reps      Access Code: [email or web address]  URL: [facility]/  Date: 10/05/2021  Prepared by: Mukund Hansen    Exercises  Half Kneeling Hip Flexor Stretch with Posterior Pelvic Tilt and Dowel - 2-3 x daily - 7 x weekly - 2-3 sets - 10 reps  Half Kneeling Hip Flexor Stretch with Sidebend - 2-3 x daily - 7 x weekly - 2-3 sets - 10 reps  Supine Bilateral Hamstring Sets - 2-3 x daily - 7 x weekly - 2-3 sets - 10 reps  Sidelying Hip Abduction with Resistance at Thighs - 2-3 x daily - 7 x weekly - 2-3 sets - 10 reps  Sidelying Feet Elevated Clamshells - 2-3 x daily - 7 x weekly - 2-3 sets - 10 reps  Forward Monster Walks - 2-3 x daily - 7 x weekly - 2-3 sets - 10 reps  Squat with Chest Press - 2-3 x daily - 7 x weekly - 2-3 sets - 10 reps  Standard Plank - 2-3 x daily - 7 x weekly - 2-3 sets - 10 reps    Access Code: [email or web address]  URL: [email or web address]  Date: 10/05/2021  Prepared by: Mukund Hansen    Exercises  Half Kneeling Hip Flexor Stretch with Posterior Pelvic Tilt and Dowel - 2-3 x daily - 7 x weekly - 2-3 sets - 10 reps  Half Kneeling Hip Flexor Stretch with Sidebend - 2-3 x daily - 7 x weekly - 2-3 sets - 10 reps  Supine Bilateral Hamstring Sets - 2-3 x daily - 7 x weekly - 2-3 sets - 10 reps  Sidelying Hip Abduction with Resistance at Thighs - 2-3 x daily - 7 x weekly - 2-3 sets - 10 reps  Sidelying Feet Elevated Clamshells - 2-3 x daily - 7 x weekly - 2-3 sets - 10 reps  Forward Monster Walks - 2-3 x daily - 7 x weekly - 2-3 sets - 10 reps  Squat with Chest Press - 2-3 x daily - 7 x weekly - 2-3 sets - 10 reps  Standard Plank - 2-3 x daily - 7 x weekly - 2-3 sets - 10 reps    Exercises/Interventions:     Therapeutic Ex Sets/reps Notes   elliptical    HSC    Glider post, PL    Supine running TB 3D GTB   Bird dog hip shift airex  Bird dog hip ext L   Mod R hip ext       SB  Bridge HSC on floor  SB hip ext   SB flexion HS iso 90 NV   Prone HSC 1 pillow Using 6# at home   EMOM  Rev hypers  HS stool walks  1/2 kneel iso chop R/L  1 min rest in between  3/10 HS exertion reported     Stool ecc ADD  Quadruped hip ext TB  Rev lunge with TB ext   NV  2x10 R/L  2x10 GTB  2x10    EMOM  Posterior reach to march  Tall kneel bridge + TB ext  KDL- table assist    No rest   EMOM  DL deadlift   TM HSC  bosu squat MB toss       Walking high knee/ baby skip  Donkey pull  KDL rearfoot elevated    SL squat to 22 in box forward leg elev    HSC machine ecc 30#   SL DL  To 6 in box 5# reach , release   Manual Intervention     CFM HS origin/scar tissue  SL ADD \"Stick\" 10 min  4 min    hypervolt  HS Relaxation 5 min Pre-workout  Post-workout   t/HS STW, gastroc, popliteus     IASTM sweeps medial and lateral HS     3D SKTC MRE conc x12 each    Prone IR ROM     NMR re-education     Deadlift Discussed lifting for strength    Sumo DL Discussed for HEP         Cook band SL deadlift NV    t-pose NV Max cues   Cook band SL deadlift NV    Prone ecc HSC MRE x10  x10 Med, lat deviation  Neutral foot allowing for proper ROM for normal function with self care, mobility, lifting and ambulation. Modalities:        [] GR/ESU 15 min    [] GR 15 min  [] ESU     [] CP    [] MHP    [x] declined     Charges:  Timed Code Treatment Minutes: 54   Total Treatment Minutes: 54     BWC time in/time out:   (and requires time in and out for each CPT code)    [x] EVAL (LOW) 69972 (typically 20 minutes face-to-face)  [] EVAL (MOD) 88141 (typically 30 minutes face-to-face)  [] EVAL (HIGH) 62919 (typically 45 minutes face-to-face)  [] RE-EVAL     [x] PJ(31781) x2     [] IONTO  [x] NMR (40505) x  1   [] VASO  [x] Manual (64097) x 1    [] Other:  [] TA x      [] Mech Traction (96232)  [] ES(attended) (62210)      [] ES (un) (74031):     GOALS:     Long Term Goals: To be achieved in: 12 weeks  1. Disability index score of 15% or less for the LEFS  to assist with reaching prior level of function. [] Progressing: [] Met: [x] Not Met: [] Adjusted  2. Patient will demonstrate increased AROM to WNL to allow for proper joint functioning as indicated by patients Functional Deficits. [x] Progressing: [] Met: [] Not Met: [] Adjusted  3. Patient will demonstrate an increase in Strength to good proximal hip and core activation to allow for proper functional mobility as indicated by patients Functional Deficits. [x] Progressing: [] Met: [] Not Met: [] Adjusted  4. Patient will return to stairs and squatting functional activities without increased symptoms or restriction. [x] Progressing: [] Met: [] Not Met: [] Adjusted  5. Pt will transition to workout routine; GAP return to running. [x] Progressing: [] Met: [] Not Met: [] Adjusted     Progression Towards Functional goals:  [x] Patient is progressing as expected towards functional goals listed. [] Progression is slowed due to complexities listed. [] Progression has been slowed due to co-morbidities.   [] Plan just implemented, too soon to assess goals progression  [] Other: Overall Progression Towards Functional goals/ Treatment Progress Update:  [] Patient is progressing as expected towards functional goals listed. [] Progression is slowed due to complexities/Impairments listed. [] Progression has been slowed due to co-morbidities. [x] Plan just implemented, too soon to assess goals progression <30days   [] Goals require adjustment due to lack of progress  [] Patient is not progressing as expected and requires additional follow up with physician  [] Other    Prognosis for POC: [x] Good [] Fair  [] Poor      Patient requires continued skilled intervention: [x] Yes  [] No    Treatment/Activity Tolerance:  [x] Patient able to complete treatment  [] Patient limited by fatigue  [] Patient limited by pain    [] Patient limited by other medical complications  [] Other:     Assessment:  Discussed medial HS and ADD bias for strength during squats and deadlifts. Deviation to lateral HS with prone ecc MRE. Guidance to patient for neutral foot at home. Return to Play: (if applicable)   []  Stage 1: Intro to Strength   []  Stage 2: Return to Run and Strength   []  Stage 3: Return to Jump and Strength   []  Stage 4: Dynamic Strength and Agility   []  Stage 5: Sport Specific Training     []  Ready to Return to Play, Meets All Above Stages   []  Not Ready for Return to Sports   Comments:                               PLAN: See eval  [x] Continue per plan of care [] Alter current plan (see comments above)  [] Plan of care initiated [] Hold pending MD visit [] Discharge      Electronically signed by:  Jose Daniel Richards PT    Note: If patient does not return for scheduled/ recommended follow up visits, this note will serve as a discharge from care along with most recent update on progress.

## 2021-11-29 ENCOUNTER — HOSPITAL ENCOUNTER (OUTPATIENT)
Dept: PHYSICAL THERAPY | Age: 51
Setting detail: THERAPIES SERIES
Discharge: HOME | End: 2021-11-29
Payer: COMMERCIAL

## 2021-11-29 PROCEDURE — 97110 THERAPEUTIC EXERCISES: CPT | Performed by: PHYSICAL THERAPIST

## 2021-11-29 PROCEDURE — 97140 MANUAL THERAPY 1/> REGIONS: CPT | Performed by: PHYSICAL THERAPIST

## 2021-11-29 NOTE — FLOWSHEET NOTE
[address] [facility] [address]      Physical Therapy Daily Treatment Note  Date:  2021    Patient Name:  John German    :  1970  MRN: [id number]  Restrictions/Precautions:    Physician Information:  Referring Practitioner: Gio Headley  Medical/Treatment Diagnosis Information:  · Diagnosis: H53.298K (ICD-10-CM) - Right hamstring injury, subsequent encounter s/p R HS proximal repair 2021   Treatment Diagnosis:  Right hip pain M25.551   [] Conservative / [] Surgical - DOS:    Insurance/Certification information:     Plan of care signed: [] YES  [] NO  Number of Comorbidities:  []0     [x]1-2    []3+  Date of Patient follow up with Physician:       Is this a Progress Report:     [x]  Yes  []  No        If Yes:  Date Range for reporting period:  Beginning 2021  Ending 2021    Progress report will be due (10 Rx or 30 days whichever is less):        Recertification will be due (POC Duration  / 90 days whichever is less): 2021        Visit # Insurance Allowable   20 30   Telehealth        Latex Allergy:  [x]NO      []YES  Preferred Language for Healthcare:   [x]English       []other:    SUBJECTIVE:  Pt reports he rowing and doing more stair climber and is feeling really good.   OBJECTIVE:    Initial Initial Current   VAS 3  0   LEFS 99%  40%   ROM LEFT RIGHT    HIP Flex  65 99 L, 107 R   HIP Abd      HIP add      HIP Ext      HIP IR   20   HIP ER      Knee ext  -15 +4   Knee Flex  90 120   Ankle PF      Ankle DF      90-90 HS   38 L 44 R   ASLR   64 L, 72 R   Strength  LEFT RIGHT    HIP Flexors  4 4+   HIP Abductors   R 4-   HIP add   3+ B   HIP Ext   4   Hip ER      Hip IR      Knee EXT (quad)  Fair activation in 15 degrees flexion 5      Observation:    Test measurements:      RESTRICTIONS/PRECAUTIONS: see protocol media tab  Wk 5: 2021  Wk 6: 9/15/2021  Wk 10: 10/13/2021 **ankle weight PRE: 1# every week bridging SLR  Wk 12: 10/27/2021     Wk 0-4 2 weeks 50% WB  Knee ext block 15  Hip flexion <90    Wk 5-6 WBAT Hip flexion 90 No active HS, no active hip ext   Wk 6-12 No brace  Wean off crutches Active hip and knee flexion Bike  Hip ext, HSC anti-gravity   Mo 3-6  Full ROM  Gentle HS (S)    Mo 6-9      Access Code: [email or web address]  URL: [email or web address]/  Date: 10/05/2021  Prepared by: Yenny Jeffers    Exercises  Standing Hip Abduction Kicks - 2-3 x daily - 7 x weekly - 2-3 sets - 10 reps  Standing Hip Adduction with Anchored Resistance - 2-3 x daily - 7 x weekly - 2-3 sets - 10 reps  Standing Hip Flexion with Resistance Loop - 2-3 x daily - 7 x weekly - 2-3 sets - 10 reps  Staggered Stance Squat - 2-3 x daily - 7 x weekly - 2-3 sets - 10 reps  Lunge Matrix - 2-3 x daily - 7 x weekly - 2-3 sets - 10 reps  Eccentric Heel Lowering on Step - 2-3 x daily - 7 x weekly - 2-3 sets - 10 reps  Single Leg Balance with Clock Reach - 2-3 x daily - 7 x weekly - 2-3 sets - 10 reps      Access Code: [email or web address]  URL: [email or web address]/  Date: 10/05/2021  Prepared by: Yenny Jeffers    Exercises  Half Kneeling Hip Flexor Stretch with Posterior Pelvic Tilt and Dowel - 2-3 x daily - 7 x weekly - 2-3 sets - 10 reps  Half Kneeling Hip Flexor Stretch with Sidebend - 2-3 x daily - 7 x weekly - 2-3 sets - 10 reps  Supine Bilateral Hamstring Sets - 2-3 x daily - 7 x weekly - 2-3 sets - 10 reps  Sidelying Hip Abduction with Resistance at Thighs - 2-3 x daily - 7 x weekly - 2-3 sets - 10 reps  Sidelying Feet Elevated Clamshells - 2-3 x daily - 7 x weekly - 2-3 sets - 10 reps  Forward Monster Walks - 2-3 x daily - 7 x weekly - 2-3 sets - 10 reps  Squat with Chest Press - 2-3 x daily - 7 x weekly - 2-3 sets - 10 reps  Standard Plank - 2-3 x daily - 7 x weekly - 2-3 sets - 10 reps    Access Code: [email or web address]  URL: [email or web address]  Date: 10/05/2021  Prepared by: Yenny Postin    Exercises  Half Kneeling Hip Flexor Stretch with Posterior Pelvic Tilt and Dowel - 2-3 x daily - 7 x weekly - 2-3 sets - 10 reps  Half Kneeling Hip Flexor Stretch with Sidebend - 2-3 x daily - 7 x weekly - 2-3 sets - 10 reps  Supine Bilateral Hamstring Sets - 2-3 x daily - 7 x weekly - 2-3 sets - 10 reps  Sidelying Hip Abduction with Resistance at Thighs - 2-3 x daily - 7 x weekly - 2-3 sets - 10 reps  Sidelying Feet Elevated Clamshells - 2-3 x daily - 7 x weekly - 2-3 sets - 10 reps  Forward Monster Walks - 2-3 x daily - 7 x weekly - 2-3 sets - 10 reps  Squat with Chest Press - 2-3 x daily - 7 x weekly - 2-3 sets - 10 reps  Standard Plank - 2-3 x daily - 7 x weekly - 2-3 sets - 10 reps    Exercises/Interventions:     Therapeutic Ex Sets/reps Notes   elliptical    HSC    Glider post, PL    Supine running TB 3D GTB   Bird dog hip shift airex  Bird dog hip ext L   Mod R hip ext       SB  Bridge HSC on floor  SB hip ext   SB flexion HS iso 90 NV   Prone HSC 1 pillow Using 6# at home   EMOM  Rev hypers  HS stool walks  1/2 kneel iso chop R/L  1 min rest in between  3/10 HS exertion reported     Stool ecc ADD  Quadruped hip ext TB  Rev lunge with TB ext     HEP    [place] HSC GREEN  KDL MB drop/catch x8  1 min R/L blue 2 rounds   EMOM  DL deadlift   TM HSC  Lateral lunge bosu 1 min R/L1 min R/L      Walking high knee/ baby skip  Donkey pull  KDL rearfoot elevated    SL squat to 22 in box forward leg elev    HSC machine ecc 30#   SL DL  To 6 in box 5# reach , release   Manual Intervention     CFM HS origin/scar tissue  SL ADD \"Stick\" 10 min      hypervolt  HS Relaxation 5 min Pre-workout  Post-workout   t/HS STW, gastroc, popliteus     IASTM sweeps medial and lateral HS     3D SKTC MRE conc     Prone IR ROM     NMR re-education     Deadlift Discussed lifting for strength    Sumo DL Discussed for HEP         Cook band SL deadlift     t-pose NV Max cues        Prone ecc HSC MRE x10  x10 Med, lat deviation  Neutral foot                 Therapeutic Exercise and NMR EXR  [x] (44823) Provided verbal/tactile cueing for activities related to strengthening, flexibility, endurance, ROM for improvements in LE, proximal hip, and core control with self care, mobility, lifting, ambulation.  [] (70855) Provided verbal/tactile cueing for activities related to improving balance, coordination, kinesthetic sense, posture, motor skill, proprioception  to assist with LE, proximal hip, and core control in self care, mobility, lifting, ambulation and eccentric single leg control.      NMR and Therapeutic Activities:    [x] (51054 or 91744) Provided verbal/tactile cueing for activities related to improving balance, coordination, kinesthetic sense, posture, motor skill, proprioception and motor activation to allow for proper function of core, proximal hip and LE with self care and ADLs  [] (77210) Gait Re-education- Provided training and instruction to the patient for proper LE, core and proximal hip recruitment and positioning and eccentric body weight control with ambulation re-education including up and down stairs     Home Exercise Program:    [x] (82527) Reviewed/Progressed HEP activities related to strengthening, flexibility, endurance, ROM of core, proximal hip and LE for functional self-care, mobility, lifting and ambulation/stair navigation   [] (72064)Reviewed/Progressed HEP activities related to improving balance, coordination, kinesthetic sense, posture, motor skill, proprioception of core, proximal hip and LE for self care, mobility, lifting, and ambulation/stair navigation      Manual Treatments:  PROM / STM / Oscillations-Mobs:  G-I, II, III, IV (PA's, Inf., Post.)  [x] (45758) Provided manual therapy to mobilize LE, proximal hip and/or LS spine soft tissue/joints for the purpose of modulating pain, promoting relaxation,  increasing ROM, reducing/eliminating soft tissue swelling/inflammation/restriction, improving soft tissue extensibility and allowing for proper ROM Functional goals/ Treatment Progress Update:  [] Patient is progressing as expected towards functional goals listed. [] Progression is slowed due to complexities/Impairments listed. [] Progression has been slowed due to co-morbidities. [x] Plan just implemented, too soon to assess goals progression <30days   [] Goals require adjustment due to lack of progress  [] Patient is not progressing as expected and requires additional follow up with physician  [] Other    Prognosis for POC: [x] Good [] Fair  [] Poor      Patient requires continued skilled intervention: [x] Yes  [] No    Treatment/Activity Tolerance:  [x] Patient able to complete treatment  [] Patient limited by fatigue  [] Patient limited by pain    [] Patient limited by other medical complications  [] Other:     Assessment:  Progressing into slight explosive movements with lateral lunge on bosu and MB drop/catch. Return to Play: (if applicable)   []  Stage 1: Intro to Strength   []  Stage 2: Return to Run and Strength   []  Stage 3: Return to Jump and Strength   []  Stage 4: Dynamic Strength and Agility   []  Stage 5: Sport Specific Training     []  Ready to Return to Play, Meets All Above Stages   []  Not Ready for Return to Sports   Comments:                               PLAN: See eval  [x] Continue per plan of care [] Alter current plan (see comments above)  [] Plan of care initiated [] Hold pending MD visit [] Discharge      Electronically signed by:  Malena Estrella PT    Note: If patient does not return for scheduled/ recommended follow up visits, this note will serve as a discharge from care along with most recent update on progress.

## 2021-12-07 ENCOUNTER — HOSPITAL ENCOUNTER (OUTPATIENT)
Dept: PHYSICAL THERAPY | Age: 51
Setting detail: THERAPIES SERIES
Discharge: HOME | End: 2021-12-07

## 2021-12-07 NOTE — FLOWSHEET NOTE
The [address], [address], [address], [place]      Physical Therapy  Cancellation/No-show Note  Patient Name:  Elian Burch  :  1970   Date:  2021  Cancelled visits to date: 1  No-shows to date: 0    For today's appointment patient:  [x]  Cancelled  []  Rescheduled appointment  []  No-show     Reason given by patient:  []  Patient ill  []  Conflicting appointment  []  No transportation    [x]  Conflict with work  []  No reason given  []  Other:     Comments:      Electronically signed by:  Edward Tate, PT

## 2021-12-13 ENCOUNTER — HOSPITAL ENCOUNTER (OUTPATIENT)
Dept: PHYSICAL THERAPY | Age: 51
Setting detail: THERAPIES SERIES
Discharge: HOME | End: 2021-12-13
Payer: COMMERCIAL

## 2021-12-13 PROCEDURE — 97140 MANUAL THERAPY 1/> REGIONS: CPT | Performed by: PHYSICAL THERAPIST

## 2021-12-13 PROCEDURE — 97110 THERAPEUTIC EXERCISES: CPT | Performed by: PHYSICAL THERAPIST

## 2021-12-13 NOTE — PLAN OF CARE
The [facility], [address] [facility], [address] [place], [place]      Physical Therapy Daily Treatment Note  Date:  2021    Patient Name:  Ameya Roberts    :  1970  MRN: [id number]  Restrictions/Precautions:    Physician Information:  Referring Practitioner: Alberteen Kehr  Medical/Treatment Diagnosis Information:  · Diagnosis: M15.816W (ICD-10-CM) - Right hamstring injury, subsequent encounter s/p R HS proximal repair 2021   Treatment Diagnosis:  Right hip pain M25.551   [] Conservative / [] Surgical - DOS:    Insurance/Certification information:     Plan of care signed: [] YES  [] NO  Number of Comorbidities:  []0     [x]1-2    []3+  Date of Patient follow up with Physician:       Is this a Progress Report:     [x]  Yes  []  No        If Yes:  Date Range for reporting period:  Beginning 2021  Ending 2021    Progress report will be due (10 Rx or 30 days whichever is less):        Recertification will be due (POC Duration  / 90 days whichever is less): 2021        Visit # Insurance Allowable    30   Telehealth        Latex Allergy:  [x]NO      []YES  Preferred Language for Healthcare:   [x]English       []other:    SUBJECTIVE:  Pt reports he has been focusing on inner thigh and HS work and no more pain or weakness. He has not run, but is doing rowing and squatting without issue. He also would like to get back to [employer].   OBJECTIVE:    Initial Initial Current   VAS 3  0   LEFS 99%  6%   ROM LEFT RIGHT    HIP Flex  65 WNL B   HIP Abd      HIP add      HIP Ext      HIP IR   15   HIP ER      Knee ext  -15 +4   Knee Flex  90 WNL   Ankle PF      Ankle DF      90-90 HS   38 L 44 R   ASLR      Strength  LEFT RIGHT HHD L/R   HIP Flexors  4    HIP Abductors   30.3/30.9   HIP add   dnd   HIP Ext      Hip ER      Seated Knee flexion  Prone knee flexion   20.4/13.6  23.1/22.3   Knee EXT (quad)  Fair activation in 15 degrees flexion  Observation:    Test measurements:      RESTRICTIONS/PRECAUTIONS: see protocol media tab  Wk 5: 9/8/2021  Wk 6: 9/15/2021  Wk 10: 10/13/2021 **ankle weight PRE: 1# every week bridging SLR  Wk 12: 10/27/2021     Wk 0-4 2 weeks 50% WB  Knee ext block 15  Hip flexion <90    Wk 5-6 WBAT Hip flexion 90 No active HS, no active hip ext   Wk 6-12 No brace  Wean off crutches Active hip and knee flexion Bike  Hip ext, HSC anti-gravity   Mo 3-6  Full ROM  Gentle HS (S)    Mo 6-9      Access Code: [email or web address]  URL: [employer]/  Date: 10/05/2021  Prepared by: Mukund Hansen    Exercises  Standing Hip Abduction Kicks - 2-3 x daily - 7 x weekly - 2-3 sets - 10 reps  Standing Hip Adduction with Anchored Resistance - 2-3 x daily - 7 x weekly - 2-3 sets - 10 reps  Standing Hip Flexion with Resistance Loop - 2-3 x daily - 7 x weekly - 2-3 sets - 10 reps  Staggered Stance Squat - 2-3 x daily - 7 x weekly - 2-3 sets - 10 reps  Lunge Matrix - 2-3 x daily - 7 x weekly - 2-3 sets - 10 reps  Eccentric Heel Lowering on Step - 2-3 x daily - 7 x weekly - 2-3 sets - 10 reps  Single Leg Balance with Clock Reach - 2-3 x daily - 7 x weekly - 2-3 sets - 10 reps      Access Code: [email or web address]  URL: [employer]/  Date: 10/05/2021  Prepared by: Mukund Hansen    Exercises  Half Kneeling Hip Flexor Stretch with Posterior Pelvic Tilt and Dowel - 2-3 x daily - 7 x weekly - 2-3 sets - 10 reps  Half Kneeling Hip Flexor Stretch with Sidebend - 2-3 x daily - 7 x weekly - 2-3 sets - 10 reps  Supine Bilateral Hamstring Sets - 2-3 x daily - 7 x weekly - 2-3 sets - 10 reps  Sidelying Hip Abduction with Resistance at Thighs - 2-3 x daily - 7 x weekly - 2-3 sets - 10 reps  Sidelying Feet Elevated Clamshells - 2-3 x daily - 7 x weekly - 2-3 sets - 10 reps  Forward Monster Walks - 2-3 x daily - 7 x weekly - 2-3 sets - 10 reps  Squat with Chest Press - 2-3 x daily - 7 x weekly - 2-3 sets - 10 reps  Standard Plank - 2-3 x daily - 7 x weekly - 2-3 sets - 10 reps    Access Code: [email or web address]  URL: [email or web address]  Date: 10/05/2021  Prepared by: Altagracia Paredes    Exercises  Half Kneeling Hip Flexor Stretch with Posterior Pelvic Tilt and Dowel - 2-3 x daily - 7 x weekly - 2-3 sets - 10 reps  Half Kneeling Hip Flexor Stretch with Sidebend - 2-3 x daily - 7 x weekly - 2-3 sets - 10 reps  Supine Bilateral Hamstring Sets - 2-3 x daily - 7 x weekly - 2-3 sets - 10 reps  Sidelying Hip Abduction with Resistance at Thighs - 2-3 x daily - 7 x weekly - 2-3 sets - 10 reps  Sidelying Feet Elevated Clamshells - 2-3 x daily - 7 x weekly - 2-3 sets - 10 reps  Forward Monster Walks - 2-3 x daily - 7 x weekly - 2-3 sets - 10 reps  Squat with Chest Press - 2-3 x daily - 7 x weekly - 2-3 sets - 10 reps  Standard Plank - 2-3 x daily - 7 x weekly - 2-3 sets - 10 reps    Exercises/Interventions:     Therapeutic Ex Sets/reps Notes   elliptical    HSC    Glider post, PL    Supine running TB 3D GTB   Bird dog hip shift airex  Bird dog hip ext L   Mod R hip ext       SB  Bridge HSC on floor  SB hip ext   SB flexion HS iso 90 NV   Prone HSC 1 pillow Using 6# at home   EMOM  Rev hypers  HS stool walks  1/2 kneel iso chop R/L  1 min rest in between  3/10 HS exertion reported     Stool ecc ADD  Quadruped hip ext TB  Rev lunge with TB ext     HEP    [place] HSC GREEN  KDL MB drop/catch 3x8  1 min R/L blue Purple band NV   EMOM  DL deadlift   TM HSC  Lateral lunge bosu 1 min R/L1 min R/L      Walking high knee/ baby skip  Donkey pull  KDL rearfoot elevated    SL squat to 22 in box forward leg elev    HSC machine ecc 30#   SL DL  To 6 in box 5# reach , release   Manual Intervention     CFM HS origin/scar tissue  SL ADD \"Stick\"  IASTM incison and surrounding [id number] min      hypervolt  HS  Pre-workout  Post-workout   t/HS STW, gastroc, popliteus     IASTM sweeps medial and lateral HS     3D SKTC MRE conc x12 each    Prone IR ROM     NMR re-education     Deadlift kickstand 3D x8 each 25# kb   Sumo DL Discussed for HEP         Cook band SL deadlift     t-pose NV Max cues        Prone ecc HSC MRE  Med, lat deviation  Neutral foot                 Therapeutic Exercise and NMR EXR  [x] (54221) Provided verbal/tactile cueing for activities related to strengthening, flexibility, endurance, ROM for improvements in LE, proximal hip, and core control with self care, mobility, lifting, ambulation.  [] (77197) Provided verbal/tactile cueing for activities related to improving balance, coordination, kinesthetic sense, posture, motor skill, proprioception  to assist with LE, proximal hip, and core control in self care, mobility, lifting, ambulation and eccentric single leg control.      NMR and Therapeutic Activities:    [x] (89115 or 96101) Provided verbal/tactile cueing for activities related to improving balance, coordination, kinesthetic sense, posture, motor skill, proprioception and motor activation to allow for proper function of core, proximal hip and LE with self care and ADLs  [] (80195) Gait Re-education- Provided training and instruction to the patient for proper LE, core and proximal hip recruitment and positioning and eccentric body weight control with ambulation re-education including up and down stairs     Home Exercise Program:    [x] (97663) Reviewed/Progressed HEP activities related to strengthening, flexibility, endurance, ROM of core, proximal hip and LE for functional self-care, mobility, lifting and ambulation/stair navigation   [] (28697)Reviewed/Progressed HEP activities related to improving balance, coordination, kinesthetic sense, posture, motor skill, proprioception of core, proximal hip and LE for self care, mobility, lifting, and ambulation/stair navigation      Manual Treatments:  PROM / STM / Oscillations-Mobs:  G-I, II, III, IV (PA's, Inf., Post.)  [x] (55144) Provided manual therapy to mobilize LE, proximal hip and/or LS spine soft tissue/joints for the purpose of modulating pain, promoting relaxation,  increasing ROM, reducing/eliminating soft tissue swelling/inflammation/restriction, improving soft tissue extensibility and allowing for proper ROM for normal function with self care, mobility, lifting and ambulation. Modalities:        [] GR/ESU 15 min    [] GR 15 min  [] ESU     [] CP    [] MHP    [x] declined     Charges:  Timed Code Treatment Minutes: 42   Total Treatment Minutes: 42     BWC time in/time out:   (and requires time in and out for each CPT code)    [x] EVAL (LOW) 94593 (typically 20 minutes face-to-face)  [] EVAL (MOD) 19675 (typically 30 minutes face-to-face)  [] EVAL (HIGH) 19190 (typically 45 minutes face-to-face)  [] RE-EVAL     [x] EE(12615) x2     [] IONTO  [] NMR (24285) x     [] VASO  [x] Manual (98640) x 1    [] Other:  [] TA x      [] Mech Traction (74686)  [] ES(attended) (48428)      [] ES (un) (99396):     GOALS:     Long Term Goals: To be achieved in: 12 weeks  1. Disability index score of 15% or less for the LEFS  to assist with reaching prior level of function. [] Progressing: [x] Met: [] Not Met: [] Adjusted  2. Patient will demonstrate increased AROM to WNL to allow for proper joint functioning as indicated by patients Functional Deficits. [x] Progressing: [] Met: [] Not Met: [] Adjusted  3. Patient will demonstrate an increase in Strength to good proximal hip and core activation to allow for proper functional mobility as indicated by patients Functional Deficits. [x] Progressing: [] Met: [] Not Met: [] Adjusted  4. Patient will return to stairs and squatting functional activities without increased symptoms or restriction. [] Progressing: [x] Met: [] Not Met: [] Adjusted  5. Pt will transition to workout routine; GAP return to running. [x] Progressing: [] Met: [] Not Met: [] Adjusted     Progression Towards Functional goals:  [x] Patient is progressing as expected towards functional goals listed.     [] Progression is slowed due to complexities listed. [] Progression has been slowed due to co-morbidities. [] Plan just implemented, too soon to assess goals progression  [] Other:     Overall Progression Towards Functional goals/ Treatment Progress Update:  [x] Patient is progressing as expected towards functional goals listed. [] Progression is slowed due to complexities/Impairments listed. [] Progression has been slowed due to co-morbidities. [] Plan just implemented, too soon to assess goals progression <30days   [] Goals require adjustment due to lack of progress  [] Patient is not progressing as expected and requires additional follow up with physician  [] Other    Prognosis for POC: [x] Good [] Fair  [] Poor      Patient requires continued skilled intervention: [x] Yes  [] No    Treatment/Activity Tolerance:  [x] Patient able to complete treatment  [] Patient limited by fatigue  [] Patient limited by pain    [] Patient limited by other medical complications  [] Other:     Assessment:  Noted HS weakness in hip flexion in [place] and CKC exercises. ROM and gross strength improving as expected. Scar tissue restriction at surgical site. Continue strengthening and transition to combination triple flexion and triple extension.   Return to Play: (if applicable)   []  Stage 1: Intro to Strength   []  Stage 2: Return to Run and Strength   []  Stage 3: Return to Jump and Strength   []  Stage 4: Dynamic Strength and Agility   []  Stage 5: Sport Specific Training     []  Ready to Return to Play, Meets All Above Stages   []  Not Ready for Return to Sports   Comments:                               PLAN: See eval  [x] Continue per plan of care [] Alter current plan (see comments above)  [] Plan of care initiated [] Hold pending MD visit [] Discharge      Electronically signed by:  Connie Vasquez PT    Note: If patient does not return for scheduled/ recommended follow up visits, this note will serve as a discharge from care along with most recent update on progress.

## 2021-12-20 ENCOUNTER — HOSPITAL ENCOUNTER (OUTPATIENT)
Dept: PHYSICAL THERAPY | Age: 51
Setting detail: THERAPIES SERIES
Discharge: HOME | End: 2021-12-20
Payer: COMMERCIAL

## 2021-12-20 PROCEDURE — 97140 MANUAL THERAPY 1/> REGIONS: CPT | Performed by: PHYSICAL THERAPIST

## 2021-12-20 PROCEDURE — 97110 THERAPEUTIC EXERCISES: CPT | Performed by: PHYSICAL THERAPIST

## 2021-12-20 PROCEDURE — 97112 NEUROMUSCULAR REEDUCATION: CPT | Performed by: PHYSICAL THERAPIST

## 2021-12-20 NOTE — PLAN OF CARE
[facility], [address] [facility], [address] [place], [place]      Physical Therapy Daily Treatment Note  Date:  2021    Patient Name:  Sheryl Obando    :  1970  MRN: [id number]  Restrictions/Precautions:    Physician Information:  Referring Practitioner: Dee Luu  Medical/Treatment Diagnosis Information:  · Diagnosis: V15.712E (ICD-10-CM) - Right hamstring injury, subsequent encounter s/p R HS proximal repair 2021   Treatment Diagnosis:  Right hip pain M25.551   [] Conservative / [] Surgical - DOS:    Insurance/Certification information:     Plan of care signed: [] YES  [] NO  Number of Comorbidities:  []0     [x]1-2    []3+  Date of Patient follow up with Physician:       Is this a Progress Report:     [x]  Yes  []  No        If Yes:  Date Range for reporting period:  Beginning 2021  Ending 2021    Progress report will be due (10 Rx or 30 days whichever is less):        Recertification will be due (POC Duration  / 90 days whichever is less): 2021        Visit # Insurance Allowable    30   Telehealth        Latex Allergy:  [x]NO      []YES  Preferred Language for Healthcare:   [x]English       []other:    SUBJECTIVE:  Pt reports he has been feeling really good. Not as pronounced scar tissue.   OBJECTIVE:    Initial Initial Current   VAS 3  0   LEFS 99%  6%   ROM LEFT RIGHT    HIP Flex  65 WNL B   HIP Abd      HIP add      HIP Ext      HIP IR   15   HIP ER      Knee ext  -15 +4   Knee Flex  90 WNL   Ankle PF      Ankle DF      90-90 HS   38 L 44 R   ASLR      Strength  LEFT RIGHT HHD L/R   HIP Flexors  4    HIP Abductors   30.3/30.9   HIP add   dnd   HIP Ext      Hip ER      Seated Knee flexion  Prone knee flexion   20.4/13.6  23.1/22.3   Knee EXT (quad)  Fair activation in 15 degrees flexion       Observation:    Test measurements:      RESTRICTIONS/PRECAUTIONS: see protocol media tab  Wk 5: 2021  Wk 6: 9/15/2021  Wk 10: 10/13/2021 **ankle weight PRE: 1# every week bridging SLR  Wk 12: 10/27/2021     Wk 0-4 2 weeks 50% WB  Knee ext block 15  Hip flexion <90    Wk 5-6 WBAT Hip flexion 90 No active HS, no active hip ext   Wk 6-12 No brace  Wean off crutches Active hip and knee flexion Bike  Hip ext, HSC anti-gravity   Mo 3-6  Full ROM  Gentle HS (S)    Mo 6-9      Access Code: [email or web address]  URL: [employer]/  Date: 10/05/2021  Prepared by: Worth Fly    Exercises  Standing Hip Abduction Kicks - 2-3 x daily - 7 x weekly - 2-3 sets - 10 reps  Standing Hip Adduction with Anchored Resistance - 2-3 x daily - 7 x weekly - 2-3 sets - 10 reps  Standing Hip Flexion with Resistance Loop - 2-3 x daily - 7 x weekly - 2-3 sets - 10 reps  Staggered Stance Squat - 2-3 x daily - 7 x weekly - 2-3 sets - 10 reps  Lunge Matrix - 2-3 x daily - 7 x weekly - 2-3 sets - 10 reps  Eccentric Heel Lowering on Step - 2-3 x daily - 7 x weekly - 2-3 sets - 10 reps  Single Leg Balance with Clock Reach - 2-3 x daily - 7 x weekly - 2-3 sets - 10 reps      Access Code: [email or web address]  URL: [employer]/  Date: 10/05/2021  Prepared by: Glazeon Fly    Exercises  Half Kneeling Hip Flexor Stretch with Posterior Pelvic Tilt and Dowel - 2-3 x daily - 7 x weekly - 2-3 sets - 10 reps  Half Kneeling Hip Flexor Stretch with Sidebend - 2-3 x daily - 7 x weekly - 2-3 sets - 10 reps  Supine Bilateral Hamstring Sets - 2-3 x daily - 7 x weekly - 2-3 sets - 10 reps  Sidelying Hip Abduction with Resistance at Thighs - 2-3 x daily - 7 x weekly - 2-3 sets - 10 reps  Sidelying Feet Elevated Clamshells - 2-3 x daily - 7 x weekly - 2-3 sets - 10 reps  Forward Monster Walks - 2-3 x daily - 7 x weekly - 2-3 sets - 10 reps  Squat with Chest Press - 2-3 x daily - 7 x weekly - 2-3 sets - 10 reps  Standard Plank - 2-3 x daily - 7 x weekly - 2-3 sets - 10 reps    Access Code: [email or web address]  URL: [email or web address]  Date: 10/05/2021  Prepared by: Patricia Polo    Exercises  Half Kneeling Hip Flexor Stretch with Posterior Pelvic Tilt and Dowel - 2-3 x daily - 7 x weekly - 2-3 sets - 10 reps  Half Kneeling Hip Flexor Stretch with Sidebend - 2-3 x daily - 7 x weekly - 2-3 sets - 10 reps  Supine Bilateral Hamstring Sets - 2-3 x daily - 7 x weekly - 2-3 sets - 10 reps  Sidelying Hip Abduction with Resistance at Thighs - 2-3 x daily - 7 x weekly - 2-3 sets - 10 reps  Sidelying Feet Elevated Clamshells - 2-3 x daily - 7 x weekly - 2-3 sets - 10 reps  Forward Monster Walks - 2-3 x daily - 7 x weekly - 2-3 sets - 10 reps  Squat with Chest Press - 2-3 x daily - 7 x weekly - 2-3 sets - 10 reps  Standard Plank - 2-3 x daily - 7 x weekly - 2-3 sets - 10 reps    Exercises/Interventions:     Therapeutic Ex Sets/reps Notes   Dynamic HS stretch for speed Purple band       Glider post, PL    Supine running TB 3D GTB                   EMOM  Rev hypers  HS stool walks  1/2 kneel iso chop R/L  1 min rest in between  3/10 HS exertion reported        [place] HSC GREEN  KDL MB drop/catch 3x8  1 min R/L blue Purple band NV   EMOM  DL deadlift   TM HSC  Lateral lunge bosu 1 min R/L1 min R/L        Bent knee add side plank 2x10    Bent knee SB bridge to ecc lower 2x10    Walking lunge with MB rotation 10 ft x2Pink MB   Manual Intervention     CFM HS origin/scar tissue  SL ADD \"Stick\"  IASTM incison and surrounding [id number] min           NMR re-education     Deadlift kickstand 3D x8 each 25# kb   Sumo DL Discussed for HEP         1/4 squat jumps 3x5    1/4 split squat to center jumps 2x5 R/L With mirror cues to prevent trunk flexion                           Therapeutic Exercise and NMR EXR  [x] (66317) Provided verbal/tactile cueing for activities related to strengthening, flexibility, endurance, ROM for improvements in LE, proximal hip, and core control with self care, mobility, lifting, ambulation.  [] (70440) Provided verbal/tactile cueing for activities related to improving balance, coordination, kinesthetic sense, posture, motor skill, proprioception  to assist with LE, proximal hip, and core control in self care, mobility, lifting, ambulation and eccentric single leg control. NMR and Therapeutic Activities:    [x] (01235 or 86263) Provided verbal/tactile cueing for activities related to improving balance, coordination, kinesthetic sense, posture, motor skill, proprioception and motor activation to allow for proper function of core, proximal hip and LE with self care and ADLs  [] (40907) Gait Re-education- Provided training and instruction to the patient for proper LE, core and proximal hip recruitment and positioning and eccentric body weight control with ambulation re-education including up and down stairs     Home Exercise Program:    [x] (76537) Reviewed/Progressed HEP activities related to strengthening, flexibility, endurance, ROM of core, proximal hip and LE for functional self-care, mobility, lifting and ambulation/stair navigation   [] (25214)Reviewed/Progressed HEP activities related to improving balance, coordination, kinesthetic sense, posture, motor skill, proprioception of core, proximal hip and LE for self care, mobility, lifting, and ambulation/stair navigation      Manual Treatments:  PROM / STM / Oscillations-Mobs:  G-I, II, III, IV (PA's, Inf., Post.)  [x] (78651) Provided manual therapy to mobilize LE, proximal hip and/or LS spine soft tissue/joints for the purpose of modulating pain, promoting relaxation,  increasing ROM, reducing/eliminating soft tissue swelling/inflammation/restriction, improving soft tissue extensibility and allowing for proper ROM for normal function with self care, mobility, lifting and ambulation.      Modalities:        [] GR/ESU 15 min    [] GR 15 min  [] ESU     [] CP    [] MHP    [x] declined     Charges:  Timed Code Treatment Minutes: 42   Total Treatment Minutes: 42     [facility] time in/time out:   (and requires time in and out for each CPT code)    [x] EVAL (LOW) 47610 (typically 20 minutes face-to-face)  [] EVAL (MOD) 69818 (typically 30 minutes face-to-face)  [] EVAL (HIGH) 31862 (typically 45 minutes face-to-face)  [] RE-EVAL     [x] PZ(56113) x1     [] IONTO  [x] NMR (80625) x 1    [] VASO  [x] Manual (97195) x 1    [] Other:  [] TA x      [] Mech Traction (72746)  [] ES(attended) (60272)      [] ES (un) (06571):     GOALS:     Long Term Goals: To be achieved in: 12 weeks  1. Disability index score of 15% or less for the LEFS  to assist with reaching prior level of function. [] Progressing: [x] Met: [] Not Met: [] Adjusted  2. Patient will demonstrate increased AROM to WNL to allow for proper joint functioning as indicated by patients Functional Deficits. [x] Progressing: [] Met: [] Not Met: [] Adjusted  3. Patient will demonstrate an increase in Strength to good proximal hip and core activation to allow for proper functional mobility as indicated by patients Functional Deficits. [x] Progressing: [] Met: [] Not Met: [] Adjusted  4. Patient will return to stairs and squatting functional activities without increased symptoms or restriction. [] Progressing: [x] Met: [] Not Met: [] Adjusted  5. Pt will transition to workout routine; GAP return to running. [x] Progressing: [] Met: [] Not Met: [] Adjusted     Progression Towards Functional goals:  [x] Patient is progressing as expected towards functional goals listed. [] Progression is slowed due to complexities listed. [] Progression has been slowed due to co-morbidities. [] Plan just implemented, too soon to assess goals progression  [] Other:     Overall Progression Towards Functional goals/ Treatment Progress Update:  [x] Patient is progressing as expected towards functional goals listed. [] Progression is slowed due to complexities/Impairments listed. [] Progression has been slowed due to co-morbidities.   [] Plan just implemented, too soon to assess goals progression <30days [] Goals require adjustment due to lack of progress  [] Patient is not progressing as expected and requires additional follow up with physician  [] Other    Prognosis for POC: [x] Good [] Fair  [] Poor      Patient requires continued skilled intervention: [x] Yes  [] No    Treatment/Activity Tolerance:  [x] Patient able to complete treatment  [] Patient limited by fatigue  [] Patient limited by pain    [] Patient limited by other medical complications  [] Other:     Assessment:  Added plyos with modified depth without issue, but noted weakness of RLE in anterior split squat position. Discussed vulnerability of HS in lengthened position and importance of eccentric training and progressive speed/plyo work to reach full potential and transition to sprinting. Pt to continue strength training and plyo initiation, as well as jogging. Discussed GAP program as adjunct over the next few months to transition to sprinting. Return to Play: (if applicable)   []  Stage 1: Intro to Strength   []  Stage 2: Return to Run and Strength   []  Stage 3: Return to Jump and Strength   []  Stage 4: Dynamic Strength and Agility   []  Stage 5: Sport Specific Training     []  Ready to Return to Play, Meets All Above Stages   []  Not Ready for Return to Sports   Comments:                               PLAN: See eval  [x] Continue per plan of care [] Alter current plan (see comments above)  [] Plan of care initiated [] Hold pending MD visit [] Discharge      Electronically signed by:  Armando Rice PT    Note: If patient does not return for scheduled/ recommended follow up visits, this note will serve as a discharge from care along with most recent update on progress.

## 2021-12-27 ENCOUNTER — HOSPITAL ENCOUNTER (OUTPATIENT)
Dept: PHYSICAL THERAPY | Age: 51
Setting detail: THERAPIES SERIES
Discharge: HOME | End: 2021-12-27
Payer: COMMERCIAL

## 2021-12-27 PROCEDURE — 97110 THERAPEUTIC EXERCISES: CPT | Performed by: PHYSICAL THERAPIST

## 2021-12-27 PROCEDURE — 97140 MANUAL THERAPY 1/> REGIONS: CPT | Performed by: PHYSICAL THERAPIST

## 2021-12-27 PROCEDURE — 97112 NEUROMUSCULAR REEDUCATION: CPT | Performed by: PHYSICAL THERAPIST

## 2021-12-27 NOTE — PLAN OF CARE
[facility], [address] [facility], [address] [place] [address]      Physical Therapy Daily Treatment Note  Date:  2021    Patient Name:  Alton Beckwith    :  1970  MRN: [id number]  Restrictions/Precautions:    Physician Information:  Referring Practitioner: Michelle Zhang  Medical/Treatment Diagnosis Information:  · Diagnosis: W69.376P (ICD-10-CM) - Right hamstring injury, subsequent encounter s/p R HS proximal repair 2021   Treatment Diagnosis:  Right hip pain M25.551   [] Conservative / [] Surgical - DOS:    Insurance/Certification information:     Plan of care signed: [] YES  [] NO  Number of Comorbidities:  []0     [x]1-2    []3+  Date of Patient follow up with Physician:       Is this a Progress Report:     [x]  Yes  []  No        If Yes:  Date Range for reporting period:  Beginning 2021  Ending 2021    Progress report will be due (10 Rx or 30 days whichever is less):        Recertification will be due (POC Duration  / 90 days whichever is less): 2021        Visit # Insurance Allowable    30   Telehealth        Latex Allergy:  [x]NO      []YES  Preferred Language for Healthcare:   [x]English       []other:    SUBJECTIVE:  Pt reports he has been feeling really good. Not as pronounced scar tissue.   OBJECTIVE:    Initial Initial Current   VAS 3  0   LEFS 99%  6%   ROM LEFT RIGHT    HIP Flex  65 WNL B   HIP Abd      HIP add      HIP Ext      HIP IR   15   HIP ER      Knee ext  -15 +4   Knee Flex  90 WNL   Ankle PF      Ankle DF      90-90 HS   38 L 44 R   ASLR      Strength  LEFT RIGHT HHD L/R   HIP Flexors  4    HIP Abductors   30.3/30.9   HIP add   dnd   HIP Ext      Hip ER      Seated Knee flexion  Prone knee flexion   20.4/13.6  23.1/22.3   Knee EXT (quad)  Fair activation in 15 degrees flexion       Observation:    Test measurements:      RESTRICTIONS/PRECAUTIONS: see protocol media tab  Wk 5: 2021  Wk 6: Tommy Madi    Exercises  Half Kneeling Hip Flexor Stretch with Posterior Pelvic Tilt and Dowel - 2-3 x daily - 7 x weekly - 2-3 sets - 10 reps  Half Kneeling Hip Flexor Stretch with Sidebend - 2-3 x daily - 7 x weekly - 2-3 sets - 10 reps  Supine Bilateral Hamstring Sets - 2-3 x daily - 7 x weekly - 2-3 sets - 10 reps  Sidelying Hip Abduction with Resistance at Thighs - 2-3 x daily - 7 x weekly - 2-3 sets - 10 reps  Sidelying Feet Elevated Clamshells - 2-3 x daily - 7 x weekly - 2-3 sets - 10 reps  Forward Monster Walks - 2-3 x daily - 7 x weekly - 2-3 sets - 10 reps  Squat with Chest Press - 2-3 x daily - 7 x weekly - 2-3 sets - 10 reps  Standard Plank - 2-3 x daily - 7 x weekly - 2-3 sets - 10 reps    Exercises/Interventions:     Therapeutic Ex Sets/reps Notes   Dynamic HS stretch for speed Purple band       Glider post, PL    Supine running TB 3D GTB                   EMOM  Rev hypers  HS stool walks  1/2 kneel iso chop R/L  1 min rest in between  3/10 HS exertion reported   [place] drops to floor  [place] SB assist X5  x5    [place] HSC   KDL MB drop/catch 3x8   Purple band   EMOM  DL deadlift   TM HSC  Lateral lunge bosu   x15 R/L          Bent knee add side plank 2x10    Bent knee SB bridge to ecc lower 2x10    Walking lunge with MB rotation 10 ft x2Pink MB   Manual Intervention     CFM HS origin/scar tissue  SL ADD \"Stick\"  IASTM incison and surrounding [id number] min      MRE 3D HS stretch x15 each    NMR re-education     Deadlift kickstand 3D x8 each 25# kb   [place] DL Discussed for HEP         1/4 squat jumps 3x5    1/4 split squat to center jumps 2x5 R/L With mirror cues to prevent trunk flexion                           Therapeutic Exercise and NMR EXR  [x] (49904) Provided verbal/tactile cueing for activities related to strengthening, flexibility, endurance, ROM for improvements in LE, proximal hip, and core control with self care, mobility, lifting, ambulation.  [] (33912) Provided verbal/tactile cueing for activities related to improving balance, coordination, kinesthetic sense, posture, motor skill, proprioception  to assist with LE, proximal hip, and core control in self care, mobility, lifting, ambulation and eccentric single leg control. NMR and Therapeutic Activities:    [x] (42865 or 75523) Provided verbal/tactile cueing for activities related to improving balance, coordination, kinesthetic sense, posture, motor skill, proprioception and motor activation to allow for proper function of core, proximal hip and LE with self care and ADLs  [] (21835) Gait Re-education- Provided training and instruction to the patient for proper LE, core and proximal hip recruitment and positioning and eccentric body weight control with ambulation re-education including up and down stairs     Home Exercise Program:    [x] (99577) Reviewed/Progressed HEP activities related to strengthening, flexibility, endurance, ROM of core, proximal hip and LE for functional self-care, mobility, lifting and ambulation/stair navigation   [] (58246)Reviewed/Progressed HEP activities related to improving balance, coordination, kinesthetic sense, posture, motor skill, proprioception of core, proximal hip and LE for self care, mobility, lifting, and ambulation/stair navigation      Manual Treatments:  PROM / STM / Oscillations-Mobs:  G-I, II, III, IV (PA's, Inf., Post.)  [x] (42032) Provided manual therapy to mobilize LE, proximal hip and/or LS spine soft tissue/joints for the purpose of modulating pain, promoting relaxation,  increasing ROM, reducing/eliminating soft tissue swelling/inflammation/restriction, improving soft tissue extensibility and allowing for proper ROM for normal function with self care, mobility, lifting and ambulation.      Modalities:        [] GR/ESU 15 min    [] GR 15 min  [] ESU     [] CP    [] MHP    [x] declined     Charges:  Timed Code Treatment Minutes: 50   Total Treatment Minutes: 50     [facility] time in/time out: (and requires time in and out for each CPT code)    [x] EVAL (LOW) 37224 (typically 20 minutes face-to-face)  [] EVAL (MOD) 78139 (typically 30 minutes face-to-face)  [] EVAL (HIGH) 74317 (typically 45 minutes face-to-face)  [] RE-EVAL     [x] KG(99619) x1     [] IONTO  [x] NMR (17171) x 1    [] VASO  [x] Manual (53917) x 1    [] Other:  [] TA x      [] Mech Traction (58647)  [] ES(attended) (29518)      [] ES (un) (33231):     GOALS:     Long Term Goals: To be achieved in: 12 weeks  1. Disability index score of 15% or less for the LEFS  to assist with reaching prior level of function. [] Progressing: [x] Met: [] Not Met: [] Adjusted  2. Patient will demonstrate increased AROM to WNL to allow for proper joint functioning as indicated by patients Functional Deficits. [] Progressing: [x] Met: [] Not Met: [] Adjusted  3. Patient will demonstrate an increase in Strength to good proximal hip and core activation to allow for proper functional mobility as indicated by patients Functional Deficits. [x] Progressing: [] Met: [] Not Met: [] Adjusted  4. Patient will return to stairs and squatting functional activities without increased symptoms or restriction. [] Progressing: [x] Met: [] Not Met: [] Adjusted  5. Pt will transition to workout routine; GAP return to running. [] Progressing: [x] Met: [] Not Met: [] Adjusted     Progression Towards Functional goals:  [x] Patient is progressing as expected towards functional goals listed. [] Progression is slowed due to complexities listed. [] Progression has been slowed due to co-morbidities. [] Plan just implemented, too soon to assess goals progression  [] Other:     Overall Progression Towards Functional goals/ Treatment Progress Update:  [x] Patient is progressing as expected towards functional goals listed. [] Progression is slowed due to complexities/Impairments listed. [] Progression has been slowed due to co-morbidities.   [] Plan just implemented, too soon to assess goals progression <30days   [] Goals require adjustment due to lack of progress  [] Patient is not progressing as expected and requires additional follow up with physician  [] Other    Prognosis for POC: [x] Good [] Fair  [] Poor      Patient requires continued skilled intervention: [x] Yes  [] No    Treatment/Activity Tolerance:  [x] Patient able to complete treatment  [] Patient limited by fatigue  [] Patient limited by pain    [] Patient limited by other medical complications  [] Other:     Assessment:  Tolerated session well. Reviewed HEP, jogging, and progressive strengthening exercises. Pt to continue independently for 1-2 months and pursue GAP for return to sprinting. Return to Play: (if applicable)   []  Stage 1: Intro to Strength   []  Stage 2: Return to Run and Strength   []  Stage 3: Return to Jump and Strength   []  Stage 4: Dynamic Strength and Agility   []  Stage 5: Sport Specific Training     []  Ready to Return to Play, Meets All Above Stages   []  Not Ready for Return to Sports   Comments:                               PLAN: See eval  [] Continue per plan of care [] Alter current plan (see comments above)  [] Plan of care initiated [] Hold pending MD visit [x] This note serves a Discharge if pt does not return. Electronically signed by:  Jones Pinedo PT    Note: If patient does not return for scheduled/ recommended follow up visits, this note will serve as a discharge from care along with most recent update on progress.

## (undated) DEVICE — JEWISH HOSPITAL TURNOVER KIT: Brand: MEDLINE INDUSTRIES, INC.

## (undated) DEVICE — DRAPE,U,ORTHOMAX: Brand: MEDLINE

## (undated) DEVICE — PROTECTOR ULN NRV PUR FOAM HK LOOP STRP ANATOMICALLY

## (undated) DEVICE — UNDERGLOVE SURG SZ 8 BLU LTX FREE SYN POLYISOPRENE POLYMER

## (undated) DEVICE — SUTURE VCRL SZ 2-0 L18IN ABSRB UD CT-1 L36MM 1/2 CIR J839D

## (undated) DEVICE — COVER LT HNDL BLU PLAS

## (undated) DEVICE — GAMMEX® NON-LATEX SIZE 8, STERILE NEOPRENE POWDER-FREE SURGICAL GLOVE: Brand: GAMMEX

## (undated) DEVICE — YANKAUER,OPEN TIP,W/O VENT,STERILE: Brand: MEDLINE INDUSTRIES, INC.

## (undated) DEVICE — PLATE ES AD W 9FT CRD 2

## (undated) DEVICE — GLOVE ORANGE PI 7 1/2   MSG9075

## (undated) DEVICE — HOLDER SCALP PLAS G STD

## (undated) DEVICE — AGENT HEMSTAT 3GM OXIDIZED REGENERATED CELOS ABSRB FOR CONT (ORDER MULTIPLES OF 5EA)

## (undated) DEVICE — BLADE CLP TAPR HD WET DRY CAPABILITY GTT IN CHARGING USE

## (undated) DEVICE — NEEDLE SUT SZ 2 MAYO 1/2 CIR TAPR PNT DISP

## (undated) DEVICE — AGENT HEMSTAT 1GM PORCINE GEL ABSRB PWD FOR CONT OOZING

## (undated) DEVICE — PACK PROCEDURE SURG TOT HIP

## (undated) DEVICE — NEEDLE SPNL 20GA L3.5IN YEL HUB S STL REG WALL FIT STYL W/

## (undated) DEVICE — APPLICATOR MEDICATED 26 CC SOLUTION HI LT ORNG CHLORAPREP

## (undated) DEVICE — TOWEL,STOP FLAG GOLD N-W: Brand: MEDLINE

## (undated) DEVICE — E-Z CLEAN, NON-STICK, PTFE COATED, ELECTROSURGICAL BLADE ELECTRODE, 2.5 INCH (6.35 CM): Brand: EZ CLEAN

## (undated) DEVICE — GARMENT,MEDLINE,DVT,INT,CALF,LG, GEN2: Brand: MEDLINE

## (undated) DEVICE — SHEET, ORTHO, SPLIT, STERILE: Brand: MEDLINE

## (undated) DEVICE — STAPLER SKIN H3.9MM WIRE DIA0.58MM CRWN 6.9MM 35 STPL ROT

## (undated) DEVICE — POSITIONER HD REST FOAM CMFRT TCH

## (undated) DEVICE — BLANKET WRM W29.9XL79.1IN UP BODY FORC AIR MISTRAL-AIR

## (undated) DEVICE — SUTURE VCRL SZ 1 L18IN ABSRB UD L36MM CT-1 1/2 CIR J841D

## (undated) DEVICE — GLOVE ORTHO 7 1/2   MSG9475

## (undated) DEVICE — SURGICAL SET UP - SURE SET: Brand: MEDLINE INDUSTRIES, INC.

## (undated) DEVICE — 3M™ TEGADERM™ TRANSPARENT FILM DRESSING FRAME STYLE, 1627, 4 IN X 10 IN (10 CM X 25 CM), 20/CT 4CT/CASE: Brand: 3M™ TEGADERM™